# Patient Record
Sex: FEMALE | URBAN - METROPOLITAN AREA
[De-identification: names, ages, dates, MRNs, and addresses within clinical notes are randomized per-mention and may not be internally consistent; named-entity substitution may affect disease eponyms.]

---

## 2020-12-01 ENCOUNTER — OUTPATIENT (OUTPATIENT)
Dept: OUTPATIENT SERVICES | Facility: HOSPITAL | Age: 35
LOS: 1 days | End: 2020-12-01
Payer: MEDICAID

## 2020-12-15 ENCOUNTER — INPATIENT (INPATIENT)
Facility: HOSPITAL | Age: 35
LOS: 8 days | Discharge: HOME | End: 2020-12-24
Attending: PSYCHIATRY & NEUROLOGY | Admitting: PSYCHIATRY & NEUROLOGY
Payer: MEDICAID

## 2020-12-15 VITALS
DIASTOLIC BLOOD PRESSURE: 92 MMHG | OXYGEN SATURATION: 97 % | TEMPERATURE: 98 F | SYSTOLIC BLOOD PRESSURE: 156 MMHG | RESPIRATION RATE: 16 BRPM | HEART RATE: 99 BPM

## 2020-12-15 DIAGNOSIS — F20.0 PARANOID SCHIZOPHRENIA: ICD-10-CM

## 2020-12-15 DIAGNOSIS — F25.9 SCHIZOAFFECTIVE DISORDER, UNSPECIFIED: ICD-10-CM

## 2020-12-15 LAB
ALBUMIN SERPL ELPH-MCNC: 4.9 G/DL — SIGNIFICANT CHANGE UP (ref 3.3–5)
ALP SERPL-CCNC: 53 U/L — SIGNIFICANT CHANGE UP (ref 40–120)
ALT FLD-CCNC: 15 U/L — SIGNIFICANT CHANGE UP (ref 4–33)
ANION GAP SERPL CALC-SCNC: 21 MMOL/L — HIGH (ref 7–14)
AST SERPL-CCNC: 34 U/L — HIGH (ref 4–32)
B PERT DNA SPEC QL NAA+PROBE: SIGNIFICANT CHANGE UP
BASOPHILS # BLD AUTO: 0.02 K/UL — SIGNIFICANT CHANGE UP (ref 0–0.2)
BASOPHILS NFR BLD AUTO: 0.2 % — SIGNIFICANT CHANGE UP (ref 0–2)
BILIRUB SERPL-MCNC: 0.8 MG/DL — SIGNIFICANT CHANGE UP (ref 0.2–1.2)
BUN SERPL-MCNC: 12 MG/DL — SIGNIFICANT CHANGE UP (ref 7–23)
C PNEUM DNA SPEC QL NAA+PROBE: SIGNIFICANT CHANGE UP
CALCIUM SERPL-MCNC: 10.3 MG/DL — SIGNIFICANT CHANGE UP (ref 8.4–10.5)
CHLORIDE SERPL-SCNC: 98 MMOL/L — SIGNIFICANT CHANGE UP (ref 98–107)
CO2 SERPL-SCNC: 15 MMOL/L — LOW (ref 22–31)
CREAT SERPL-MCNC: 1.11 MG/DL — SIGNIFICANT CHANGE UP (ref 0.5–1.3)
EOSINOPHIL # BLD AUTO: 0.02 K/UL — SIGNIFICANT CHANGE UP (ref 0–0.5)
EOSINOPHIL NFR BLD AUTO: 0.2 % — SIGNIFICANT CHANGE UP (ref 0–6)
FLUAV H1 2009 PAND RNA SPEC QL NAA+PROBE: SIGNIFICANT CHANGE UP
FLUAV H1 RNA SPEC QL NAA+PROBE: SIGNIFICANT CHANGE UP
FLUAV H3 RNA SPEC QL NAA+PROBE: SIGNIFICANT CHANGE UP
FLUAV SUBTYP SPEC NAA+PROBE: SIGNIFICANT CHANGE UP
FLUBV RNA SPEC QL NAA+PROBE: SIGNIFICANT CHANGE UP
GLUCOSE BLDC GLUCOMTR-MCNC: 102 MG/DL — HIGH (ref 70–99)
GLUCOSE BLDC GLUCOMTR-MCNC: 63 MG/DL — LOW (ref 70–99)
GLUCOSE BLDC GLUCOMTR-MCNC: 67 MG/DL — LOW (ref 70–99)
GLUCOSE BLDC GLUCOMTR-MCNC: 70 MG/DL — SIGNIFICANT CHANGE UP (ref 70–99)
GLUCOSE BLDC GLUCOMTR-MCNC: 79 MG/DL — SIGNIFICANT CHANGE UP (ref 70–99)
GLUCOSE SERPL-MCNC: 73 MG/DL — SIGNIFICANT CHANGE UP (ref 70–99)
HADV DNA SPEC QL NAA+PROBE: SIGNIFICANT CHANGE UP
HCG SERPL-ACNC: <5 MIU/ML — SIGNIFICANT CHANGE UP
HCOV PNL SPEC NAA+PROBE: SIGNIFICANT CHANGE UP
HCT VFR BLD CALC: 46.5 % — HIGH (ref 34.5–45)
HGB BLD-MCNC: 15.1 G/DL — SIGNIFICANT CHANGE UP (ref 11.5–15.5)
HMPV RNA SPEC QL NAA+PROBE: SIGNIFICANT CHANGE UP
HPIV1 RNA SPEC QL NAA+PROBE: SIGNIFICANT CHANGE UP
HPIV2 RNA SPEC QL NAA+PROBE: SIGNIFICANT CHANGE UP
HPIV3 RNA SPEC QL NAA+PROBE: SIGNIFICANT CHANGE UP
HPIV4 RNA SPEC QL NAA+PROBE: SIGNIFICANT CHANGE UP
IANC: 6.68 K/UL — SIGNIFICANT CHANGE UP (ref 1.5–8.5)
IMM GRANULOCYTES NFR BLD AUTO: 0.3 % — SIGNIFICANT CHANGE UP (ref 0–1.5)
LYMPHOCYTES # BLD AUTO: 1.57 K/UL — SIGNIFICANT CHANGE UP (ref 1–3.3)
LYMPHOCYTES # BLD AUTO: 17.2 % — SIGNIFICANT CHANGE UP (ref 13–44)
MCHC RBC-ENTMCNC: 27.1 PG — SIGNIFICANT CHANGE UP (ref 27–34)
MCHC RBC-ENTMCNC: 32.5 GM/DL — SIGNIFICANT CHANGE UP (ref 32–36)
MCV RBC AUTO: 83.5 FL — SIGNIFICANT CHANGE UP (ref 80–100)
MONOCYTES # BLD AUTO: 0.82 K/UL — SIGNIFICANT CHANGE UP (ref 0–0.9)
MONOCYTES NFR BLD AUTO: 9 % — SIGNIFICANT CHANGE UP (ref 2–14)
NEUTROPHILS # BLD AUTO: 6.68 K/UL — SIGNIFICANT CHANGE UP (ref 1.8–7.4)
NEUTROPHILS NFR BLD AUTO: 73.1 % — SIGNIFICANT CHANGE UP (ref 43–77)
NRBC # BLD: 0 /100 WBCS — SIGNIFICANT CHANGE UP
NRBC # FLD: 0 K/UL — SIGNIFICANT CHANGE UP
PLATELET # BLD AUTO: 178 K/UL — SIGNIFICANT CHANGE UP (ref 150–400)
POTASSIUM SERPL-MCNC: 4.3 MMOL/L — SIGNIFICANT CHANGE UP (ref 3.5–5.3)
POTASSIUM SERPL-SCNC: 4.3 MMOL/L — SIGNIFICANT CHANGE UP (ref 3.5–5.3)
PROT SERPL-MCNC: 8.1 G/DL — SIGNIFICANT CHANGE UP (ref 6–8.3)
RAPID RVP RESULT: SIGNIFICANT CHANGE UP
RBC # BLD: 5.57 M/UL — HIGH (ref 3.8–5.2)
RBC # FLD: 13.9 % — SIGNIFICANT CHANGE UP (ref 10.3–14.5)
RSV RNA SPEC QL NAA+PROBE: SIGNIFICANT CHANGE UP
RV+EV RNA SPEC QL NAA+PROBE: SIGNIFICANT CHANGE UP
SARS-COV-2 IGG SERPL QL IA: NEGATIVE — SIGNIFICANT CHANGE UP
SARS-COV-2 IGM SERPL IA-ACNC: <0.1 INDEX — SIGNIFICANT CHANGE UP
SARS-COV-2 RNA SPEC QL NAA+PROBE: SIGNIFICANT CHANGE UP
SODIUM SERPL-SCNC: 134 MMOL/L — LOW (ref 135–145)
TOXICOLOGY SCREEN, DRUGS OF ABUSE, SERUM RESULT: SIGNIFICANT CHANGE UP
TSH SERPL-MCNC: 2.05 UIU/ML — SIGNIFICANT CHANGE UP (ref 0.27–4.2)
WBC # BLD: 9.14 K/UL — SIGNIFICANT CHANGE UP (ref 3.8–10.5)
WBC # FLD AUTO: 9.14 K/UL — SIGNIFICANT CHANGE UP (ref 3.8–10.5)

## 2020-12-15 PROCEDURE — 99285 EMERGENCY DEPT VISIT HI MDM: CPT

## 2020-12-15 PROCEDURE — 99284 EMERGENCY DEPT VISIT MOD MDM: CPT

## 2020-12-15 RX ORDER — DEXTROSE 50 % IN WATER 50 %
15 SYRINGE (ML) INTRAVENOUS ONCE
Refills: 0 | Status: DISCONTINUED | OUTPATIENT
Start: 2020-12-15 | End: 2020-12-22

## 2020-12-15 RX ORDER — DIPHENHYDRAMINE HCL 50 MG
50 CAPSULE ORAL ONCE
Refills: 0 | Status: DISCONTINUED | OUTPATIENT
Start: 2020-12-15 | End: 2020-12-24

## 2020-12-15 RX ORDER — GLUCAGON INJECTION, SOLUTION 0.5 MG/.1ML
1 INJECTION, SOLUTION SUBCUTANEOUS ONCE
Refills: 0 | Status: DISCONTINUED | OUTPATIENT
Start: 2020-12-15 | End: 2020-12-22

## 2020-12-15 RX ORDER — BENZTROPINE MESYLATE 1 MG
1 TABLET ORAL
Refills: 0 | Status: DISCONTINUED | OUTPATIENT
Start: 2020-12-15 | End: 2020-12-24

## 2020-12-15 RX ORDER — HALOPERIDOL DECANOATE 100 MG/ML
5 INJECTION INTRAMUSCULAR ONCE
Refills: 0 | Status: COMPLETED | OUTPATIENT
Start: 2020-12-15 | End: 2020-12-15

## 2020-12-15 RX ORDER — FLUPHENAZINE HYDROCHLORIDE 1 MG/1
5 TABLET, FILM COATED ORAL
Refills: 0 | Status: DISCONTINUED | OUTPATIENT
Start: 2020-12-15 | End: 2020-12-17

## 2020-12-15 RX ORDER — DIPHENHYDRAMINE HCL 50 MG
50 CAPSULE ORAL EVERY 6 HOURS
Refills: 0 | Status: DISCONTINUED | OUTPATIENT
Start: 2020-12-15 | End: 2020-12-24

## 2020-12-15 RX ORDER — INSULIN LISPRO 100/ML
VIAL (ML) SUBCUTANEOUS
Refills: 0 | Status: DISCONTINUED | OUTPATIENT
Start: 2020-12-15 | End: 2020-12-22

## 2020-12-15 RX ORDER — HALOPERIDOL DECANOATE 100 MG/ML
5 INJECTION INTRAMUSCULAR EVERY 6 HOURS
Refills: 0 | Status: DISCONTINUED | OUTPATIENT
Start: 2020-12-15 | End: 2020-12-24

## 2020-12-15 RX ORDER — HALOPERIDOL DECANOATE 100 MG/ML
5 INJECTION INTRAMUSCULAR ONCE
Refills: 0 | Status: DISCONTINUED | OUTPATIENT
Start: 2020-12-15 | End: 2020-12-24

## 2020-12-15 RX ADMIN — HALOPERIDOL DECANOATE 5 MILLIGRAM(S): 100 INJECTION INTRAMUSCULAR at 03:55

## 2020-12-15 RX ADMIN — Medication 2 MILLIGRAM(S): at 03:55

## 2020-12-15 NOTE — ED BEHAVIORAL HEALTH ASSESSMENT NOTE - VIOLENCE RISK FACTORS:
Impulsivity/Affective dysregulation/Feeling of being under threat and being unable to control threat/Violent ideation/threat/speech/Noncompliance with treatment/Irritability
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
## 2020-12-15 NOTE — ED BEHAVIORAL HEALTH ASSESSMENT NOTE - PSYCHIATRIC ISSUES AND PLAN (INCLUDE STANDING AND PRN MEDICATION)
Restart Prolixin 5mg po bid with hopes of transition to MANCILLA, c/w cogentin 1mg po bid, PRNS: Haldol 5mg po/im, ativan 2mg po/im q6hrs PRN

## 2020-12-15 NOTE — ED BEHAVIORAL HEALTH NOTE - BEHAVIORAL HEALTH NOTE
Worker received a call from patient's friend Norma ([phone]) who would like information related to patient. Worker informed Ms. Green that she is not able to provide information related to patient at this time. No collateral listed in provider note for patient. Worker was not previously involved with case. Worker informed that if Ms. Green needs information related to patient she would have to reach out to the patient. Discussed case with team.
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
## 2020-12-15 NOTE — ED BEHAVIORAL HEALTH ASSESSMENT NOTE - OTHER
Confucianist preoccupation pending bed assignment [id number] problems with housing, problems with access to healthcare

## 2020-12-15 NOTE — ED ADULT NURSE REASSESSMENT NOTE - NS ED NURSE REASSESS COMMENT FT1
Repeat blood glucose FS was 79mg/dl on repeat test at 1533. Left with  ED with [facility] EMS for transfer to [place] [address]. Alert, oriented, and ambulatory on leave. Reported to RN there.

## 2020-12-15 NOTE — ED ADULT NURSE REASSESSMENT NOTE - NS ED NURSE REASSESS COMMENT FT1
Pt arrives via stretcher with EMS and PS, she is awake with disorganized TP, hyperverbal and unable to verbally redirect. Pt medicated as ordered by Chris DELGADO on ems stretcher. After medication received, pt began yelling aggressively unprovoked. Pt remains with PD and handcuffed (not under arrest) until meds have good effect and pt is less agitated.

## 2020-12-15 NOTE — ED BEHAVIORAL HEALTH ASSESSMENT NOTE - NSSUICRSKFACTOR_PSY_ALL_CORE
Presenting Symptoms/Treatment Related Factors/Current and Past Psychiatric Diagnoses/Activating Events/Stressors

## 2020-12-15 NOTE — ED BEHAVIORAL HEALTH NOTE - BEHAVIORAL HEALTH NOTE
Worker was contacted by Patient's spouse Aries Deng [phone]. Spouse wanted to know if patient was being admitted to the hospital. Worker informed patient's spouse patient is admitted, and will be going to [place] [address].

## 2020-12-15 NOTE — ED BEHAVIORAL HEALTH ASSESSMENT NOTE - DESCRIPTION
brought in to ED with EMS and [place] in handcuffs. She was agitated, talking quickly, threatening and received Haldol 5mg and Ativan 2mg IM.   Vital Signs Last 24 Hrs  T(C): 36.7 (15 Dec 2020 03:30), Max: 36.7 (15 Dec 2020 03:30)  T(F): 98 (15 Dec 2020 03:30), Max: 98 (15 Dec 2020 03:30)  HR: 99 (15 Dec 2020 03:30) (99 - 99)  BP: 156/92 (15 Dec 2020 03:30) (156/92 - 156/92)  BP(mean): --  RR: 16 (15 Dec 2020 03:30) (16 - 16)  SpO2: 97% (15 Dec 2020 03:30) (97% - 97%) diabetes undomiciled, adopted, unemployed, has 2 children (6 and 2) who live with father

## 2020-12-15 NOTE — ED PROVIDER NOTE - PROGRESS NOTE DETAILS
Dr. James: Pt signed out to me at 8 AM from Dr. Perez, medically cleared and awaiting psychiatric admission; pt is a 34 yo female with schizophrenia who presented with disorganization.  She requires inpatient psychiatric hospitalization for stabilization.  I went to evaluate pt and she was sleeping, awoke easily, states that she wants to go home, denying any acute complaints.  Will continue to monitor while awaiting inpatient bed. NP Venecia- pt blood glucose was checked prior to [facility] transfer 68. Food and juice provided rechecked 63, more food and juice provided, encouraged PO intake, will reassess. No headache no dizziness, no increased thirst or oliguria.
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
## 2020-12-15 NOTE — ED BEHAVIORAL HEALTH NOTE - BEHAVIORAL HEALTH NOTE
COVID Exposure Screen- Patient     1.        *Have you had a COVID-19 test in the last 21 days?  (  ) Yes   ( x ) No   (  ) Unknown- Reason: ______  IF YES PROCEED TO QUESTION #2. IF NO OR UNKNOWN THEN PLEASE SKIP TO QUESTION #3.  2.        Date of test: ________  3.        3. Do you know the result? (  ) Negative   (  ) Positive   (  ) No result available  4.        *In the past 14 days, have you been around anyone with a positive COVID-19 test?*  (  ) Yes   (  x) No   (  ) Unknown- Reason (e.g. patient uncertain, sedated, refusing to answer, etc.):  ______  IF YES PROCEED TO QUESTION #5. IF NO or UNKNOWN, PLEASE SKIP TO QUESTION #10  5.        Were you within 6 feet of them for at least 15 minutes? (  ) Yes   (  ) No   (  ) Unknown- Reason: _____  6.        Have you provided care for them? (  ) Yes   (  ) No   (  ) Unknown- Reason: ______  7.        Have you had direct physical contact with them (touched, hugged, or kissed them)? (  ) Yes   (  ) No    (  ) Unknown- Reason: ___  8.        Have you shared eating or drinking utensils with them? (  ) Yes   (  ) No    (  ) Unknown- Reason: ____  9.        Have they sneezed, coughed, or somehow got respiratory droplets on you? (  ) Yes   (  ) No    (  ) Unknown- Reason: ______  10.     *Have you been out of [place] within the past 14 days?*  (  ) Yes   (x  ) No   (  ) Unknown- Reason (e.g. patient uncertain, sedated, refusing to answer, etc.): _______  IF YES PLEASE ANSWER THE FOLLOWING QUESTIONS:  11.     Which state/country have you been to? ______  12.     Were you there over 24 hours? (  ) Yes   (  ) No    (  ) Unknown- Reason: ______  13.     Date of return to [facility]: ______
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
## 2020-12-15 NOTE — ED BEHAVIORAL HEALTH ASSESSMENT NOTE - RISK ASSESSMENT
pt is at elevated risk of harm to others secondary to acute psychosis. Risk factors include med non-adherence, acute psychosis, paranoia, being undomiciled and recent unprovoked aggression.  Protective factors include No si/i/p, no hx of SA, no substance use.   Pt requires hospitalization for stabilization. Low Acute Suicide Risk

## 2020-12-15 NOTE — ED PROVIDER NOTE - CLINICAL SUMMARY MEDICAL DECISION MAKING FREE TEXT BOX
pt with symptoms that appear to be disorganized chronic schizophrenia.  Will consult  but anticipate need for emergent hospitalization.  Will clear medically in meantime.
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
## 2020-12-15 NOTE — ED ADULT NURSE REASSESSMENT NOTE - NS ED NURSE REASSESS COMMENT FT1
Received pt from previous shift. Pt asleep in bedroom, not in any physical distress. Even and unlabored breathing. Will continue monitoring.

## 2020-12-15 NOTE — ED ADULT NURSE REASSESSMENT NOTE - NS ED NURSE REASSESS COMMENT FT1
+ effect from IM meds received. Pt is laying in bed, she is arousable to verbal stimuli. Calm and cooperative with blood draw. Psych eval completed, lab result and MC pending for psychiatric admission. Will continue to monitor for safety.

## 2020-12-15 NOTE — ED BEHAVIORAL HEALTH ASSESSMENT NOTE - NSACTIVEVENT_PSY_ALL_CORE
Triggering events leading to humiliation, shame, and/or despair (e.g., Loss of relationship, financial or health status) (real or anticipated)/Pending incarceration or homelessness/Inadequate social supports
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
## 2020-12-15 NOTE — ED ADULT NURSE REASSESSMENT NOTE - NS ED NURSE REASSESS COMMENT FT1
Pt EMS transfer placed on hold, due to low blood glucose fingerstick at 1447- results were 67mg/dl. Given 2 cups orange juice PO and 1 can ginger ale. Pt EMS transfer placed on hold, due to low blood glucose fingerstick at 1447- results were 67mg/dl. Given 2 cups orange juice PO and 1 can ginger ale. will follow up.

## 2020-12-15 NOTE — ED BEHAVIORAL HEALTH ASSESSMENT NOTE - OTHER PAST PSYCHIATRIC HISTORY (INCLUDE DETAILS REGARDING ONSET, COURSE OF ILLNESS, INPATIENT/OUTPATIENT TREATMENT)
As per Leon patient has had 4 past hospitalizations in [place]. As per patient she was most recently hospitalized at [facility] in November.   As per leon patient is a current patient At [facility] in [place] - [phone]. Last billed was on 11/04/2020.

## 2020-12-15 NOTE — ED PROVIDER NOTE - PHYSICAL EXAMINATION
Gen: Well appearing in NAD  Head: NC/AT  Neck: trachea midline  Resp:  No distress  Ext: no deformities  Neuro:  A&O appears non focal  Skin:  Warm and dry as visualized  Psych:  Disorganized, tangential thoughts, no HI SI AH VH, delusions and paranoid Gen: Well appearing in NAD  Head: NC/AT  Neck: trachea midline  Resp:  No distress  Ext: no deformities  Neuro:  A&O appears non focal  Skin:  Warm and dry as visualized  Psych:  Disorganized, tangential thoughts, no HI SI AH VH, delusions and paranoid.
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
## 2020-12-16 LAB
AMPHET UR-MCNC: NEGATIVE — SIGNIFICANT CHANGE UP
BARBITURATES UR SCN-MCNC: NEGATIVE — SIGNIFICANT CHANGE UP
BENZODIAZ UR-MCNC: NEGATIVE — SIGNIFICANT CHANGE UP
CHOLEST SERPL-MCNC: 235 MG/DL — HIGH
COCAINE METAB.OTHER UR-MCNC: NEGATIVE — SIGNIFICANT CHANGE UP
CREATININE URINE RESULT, DAU: 56 MG/DL — SIGNIFICANT CHANGE UP
GLUCOSE BLDC GLUCOMTR-MCNC: 71 MG/DL — SIGNIFICANT CHANGE UP (ref 70–99)
GLUCOSE BLDC GLUCOMTR-MCNC: 74 MG/DL — SIGNIFICANT CHANGE UP (ref 70–99)
GLUCOSE BLDC GLUCOMTR-MCNC: 77 MG/DL — SIGNIFICANT CHANGE UP (ref 70–99)
GLUCOSE BLDC GLUCOMTR-MCNC: 96 MG/DL — SIGNIFICANT CHANGE UP (ref 70–99)
HCT VFR BLD CALC: 44.8 % — SIGNIFICANT CHANGE UP (ref 34.5–45)
HDLC SERPL-MCNC: 48 MG/DL — LOW
HGB BLD-MCNC: 14.7 G/DL — SIGNIFICANT CHANGE UP (ref 11.5–15.5)
LIPID PNL WITH DIRECT LDL SERPL: 176 MG/DL — HIGH
MCHC RBC-ENTMCNC: 27.3 PG — SIGNIFICANT CHANGE UP (ref 27–34)
MCHC RBC-ENTMCNC: 32.8 GM/DL — SIGNIFICANT CHANGE UP (ref 32–36)
MCV RBC AUTO: 83.3 FL — SIGNIFICANT CHANGE UP (ref 80–100)
METHADONE UR-MCNC: NEGATIVE — SIGNIFICANT CHANGE UP
NON HDL CHOLESTEROL: 187 MG/DL — HIGH
NRBC # BLD: 0 /100 WBCS — SIGNIFICANT CHANGE UP
NRBC # FLD: 0 K/UL — SIGNIFICANT CHANGE UP
OPIATES UR-MCNC: NEGATIVE — SIGNIFICANT CHANGE UP
OXYCODONE UR-MCNC: NEGATIVE — SIGNIFICANT CHANGE UP
PCP SPEC-MCNC: SIGNIFICANT CHANGE UP
PCP UR-MCNC: NEGATIVE — SIGNIFICANT CHANGE UP
PLATELET # BLD AUTO: 331 K/UL — SIGNIFICANT CHANGE UP (ref 150–400)
RBC # BLD: 5.38 M/UL — HIGH (ref 3.8–5.2)
RBC # FLD: 14 % — SIGNIFICANT CHANGE UP (ref 10.3–14.5)
THC UR QL: POSITIVE
TRIGL SERPL-MCNC: 56 MG/DL — SIGNIFICANT CHANGE UP
WBC # BLD: 5.53 K/UL — SIGNIFICANT CHANGE UP (ref 3.8–10.5)
WBC # FLD AUTO: 5.53 K/UL — SIGNIFICANT CHANGE UP (ref 3.8–10.5)

## 2020-12-16 PROCEDURE — 99222 1ST HOSP IP/OBS MODERATE 55: CPT

## 2020-12-16 RX ADMIN — FLUPHENAZINE HYDROCHLORIDE 5 MILLIGRAM(S): 1 TABLET, FILM COATED ORAL at 22:06

## 2020-12-16 RX ADMIN — Medication 1 MILLIGRAM(S): at 22:06

## 2020-12-16 NOTE — BH INPATIENT PSYCHIATRY ASSESSMENT NOTE - NSSUICRSKFACTOR_PSY_ALL_CORE
Current and Past Psychiatric Diagnoses/Presenting Symptoms/Treatment Related Factors/Activating Events/Stressors

## 2020-12-16 NOTE — BH SOCIAL WORK INITIAL PSYCHOSOCIAL EVALUATION - NSBHABUSEAPSFT_PSY_ALL_CORE
Patient reports she has an open case with a hx of prior cases. She will not give more details. She is not willing to sign consent for ACS.

## 2020-12-16 NOTE — BH INPATIENT PSYCHIATRY ASSESSMENT NOTE - NSBHMETABOLIC_PSY_ALL_CORE_FT
BMI: BMI (kg/m2): 27.8 (12-15-20 @ 15:55)  HbA1c:   Glucose: POCT Blood Glucose.: 71 mg/dL (12-16-20 @ 11:52)    BP: 133/59 (12-15-20 @ 14:15) (105/65 - 156/92)  Lipid Panel: Date/Time: 12-16-20 @ 12:20  Cholesterol, Serum: 235  Direct LDL: --  HDL Cholesterol, Serum: 48  Total Cholesterol/HDL Ration Measurement: --  Triglycerides, Serum: 56

## 2020-12-16 NOTE — BH INPATIENT PSYCHIATRY ASSESSMENT NOTE - NSBHADMITMEDEDUDETAILS_PSY_A_CORE FT
Pt. was admitted on current medication regimen and improved. Consequences of treatment non-adherence discussed and pt. continues to refuse medications.

## 2020-12-16 NOTE — PSYCHIATRIC REHAB INITIAL EVALUATION - NSBHEDULEVEL_PSY_ALL_CORE
Pt reported she has some college credits and she has 12 credit left to be graduate./High (Secondary) School

## 2020-12-16 NOTE — PSYCHIATRIC REHAB INITIAL EVALUATION - NSBHSTRENGTHHOME_PSY_ALL_CORE
Pt stated she enjoy her home life with her children. Pt stated she likes to cook and spending time with her children.

## 2020-12-16 NOTE — BH INPATIENT PSYCHIATRY ASSESSMENT NOTE - NSACTIVEVENT_PSY_ALL_CORE
Triggering events leading to humiliation, shame, and/or despair (e.g., Loss of relationship, financial or health status) (real or anticipated)/Substance intoxication or withdrawal/Inadequate social supports

## 2020-12-16 NOTE — BH INPATIENT PSYCHIATRY ASSESSMENT NOTE - NSBHCHARTREVIEWVS_PSY_A_CORE FT
Vital Signs Last 24 Hrs  T(C): 36.4 (16 Dec 2020 09:15), Max: 37.4 (15 Dec 2020 15:55)  T(F): 97.6 (16 Dec 2020 09:15), Max: 99.3 (15 Dec 2020 15:55)  HR: 93 (15 Dec 2020 14:15) (93 - 93)  BP: 133/59 (15 Dec 2020 14:15) (133/59 - 133/59)  BP(mean): --  RR: 17 (16 Dec 2020 09:15) (17 - 18)  SpO2: 100% (16 Dec 2020 09:15) (100% - 100%)
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
## 2020-12-16 NOTE — PSYCHIATRIC REHAB INITIAL EVALUATION - NSBHPRTOOLBOX_PSY_ALL_CORE
Exercise/Family support/Friend support Exercise/Family support/Friend support/Spirituality/Latter day

## 2020-12-16 NOTE — PSYCHIATRIC REHAB INITIAL EVALUATION - NSBHALCSUBTREAT_PSY_ALL_CORE
Outpatient clinic (specify) Pt stated she is in treatment with [facility] prior to her admission./Outpatient clinic (specify)

## 2020-12-16 NOTE — BH INPATIENT PSYCHIATRY ASSESSMENT NOTE - VIOLENCE RISK FACTORS:
Substance abuse/Noncompliance with treatment/Community stressors that increase the risk of destabilization

## 2020-12-16 NOTE — BH INPATIENT PSYCHIATRY ASSESSMENT NOTE - CURRENT MEDICATION
MEDICATIONS  (STANDING):  benztropine 1 milliGRAM(s) Oral two times a day  dextrose 40% Gel 15 Gram(s) Oral once  fluPHENAZine 5 milliGRAM(s) Oral two times a day  glucagon  Injectable 1 milliGRAM(s) IntraMuscular once  insulin lispro (ADMELOG) corrective regimen sliding scale   SubCutaneous three times a day before meals    MEDICATIONS  (PRN):  diphenhydrAMINE 50 milliGRAM(s) Oral every 6 hours PRN eps ppx/agitation  diphenhydrAMINE   Injectable 50 milliGRAM(s) IntraMuscular once PRN eps ppx/severe agitation  haloperidol     Tablet 5 milliGRAM(s) Oral every 6 hours PRN agitation  haloperidol    Injectable 5 milliGRAM(s) IntraMuscular once PRN severe agitation  LORazepam     Tablet 2 milliGRAM(s) Oral every 6 hours PRN Agitation  LORazepam   Injectable 2 milliGRAM(s) IntraMuscular Once PRN severe agitation

## 2020-12-16 NOTE — BH INPATIENT PSYCHIATRY ASSESSMENT NOTE - OTHER PAST PSYCHIATRIC HISTORY (INCLUDE DETAILS REGARDING ONSET, COURSE OF ILLNESS, INPATIENT/OUTPATIENT TREATMENT)
As per Leon patient has had 4 past hospitalizations in [place]. As per patient she was most recently hospitalized at [facility] in November.   As per leon patient is a current patient At [facility] in [place] - [phone]. Last billed was on 11/04/2020.

## 2020-12-16 NOTE — BH INPATIENT PSYCHIATRY ASSESSMENT NOTE - NSCGISEVERILLNESS_PSY_ALL_CORE
4 = Moderately ill – overt symptoms causing noticeable, but modest, functional impairment or distress; symptom level may warrant medication

## 2020-12-16 NOTE — BH INPATIENT PSYCHIATRY ASSESSMENT NOTE - DESCRIPTION
undomiciled, adopted, unemployed, has 2 children (6 and 2) who live with father undomiciled, adopted, unemployed, has 2 children (6 and 2) who live with father. He has temporary custody. Active in her Yarsanism.

## 2020-12-16 NOTE — BH INPATIENT PSYCHIATRY ASSESSMENT NOTE - NSTREATMENTCERTY_PSY_ALL_CORE

## 2020-12-16 NOTE — PSYCHIATRIC REHAB INITIAL EVALUATION - NSBHEMPSTRENGTHSFT_PSY_ALL_CORE
Pt stated she likes her job and  Pt stated she likes her job. Pt is unable to identify other strengths of employment at this time.
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
## 2020-12-16 NOTE — BH INPATIENT PSYCHIATRY ASSESSMENT NOTE - NSSUICPROTFACT_PSY_ALL_CORE
Responsibility to children, family, or others/Cultural, spiritual and/or moral attitudes against suicide/Synagogue beliefs
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
## 2020-12-16 NOTE — PSYCHIATRIC REHAB INITIAL EVALUATION - NSBHEMPDATESFT_PSY_ALL_CORE
Pt stated she has been working with her current employer for the past 3 months Pt stated she has been working with her current employer for the past 3 months.
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
## 2020-12-17 LAB
GLUCOSE BLDC GLUCOMTR-MCNC: 70 MG/DL — SIGNIFICANT CHANGE UP (ref 70–99)
GLUCOSE BLDC GLUCOMTR-MCNC: 94 MG/DL — SIGNIFICANT CHANGE UP (ref 70–99)
GLUCOSE BLDC GLUCOMTR-MCNC: 95 MG/DL — SIGNIFICANT CHANGE UP (ref 70–99)

## 2020-12-17 PROCEDURE — 99232 SBSQ HOSP IP/OBS MODERATE 35: CPT

## 2020-12-17 RX ORDER — FLUPHENAZINE HYDROCHLORIDE 1 MG/1
5 TABLET, FILM COATED ORAL AT BEDTIME
Refills: 0 | Status: DISCONTINUED | OUTPATIENT
Start: 2020-12-17 | End: 2020-12-24

## 2020-12-17 RX ORDER — FLUPHENAZINE HYDROCHLORIDE 1 MG/1
12.5 TABLET, FILM COATED ORAL ONCE
Refills: 0 | Status: COMPLETED | OUTPATIENT
Start: 2020-12-18 | End: 2020-12-18

## 2020-12-17 RX ADMIN — Medication 1 MILLIGRAM(S): at 20:14

## 2020-12-17 RX ADMIN — FLUPHENAZINE HYDROCHLORIDE 5 MILLIGRAM(S): 1 TABLET, FILM COATED ORAL at 09:23

## 2020-12-17 RX ADMIN — FLUPHENAZINE HYDROCHLORIDE 5 MILLIGRAM(S): 1 TABLET, FILM COATED ORAL at 20:13

## 2020-12-17 RX ADMIN — Medication 1 MILLIGRAM(S): at 09:23

## 2020-12-17 NOTE — BH INPATIENT PSYCHIATRY PROGRESS NOTE - NSBHASSESSSUMMFT_PSY_ALL_CORE
The patient is guarded, minimizing all symptoms, refusing medications, denying she has schizophrenia or diabetes; A1C scheduled for tomorrow. Given collateral information and recent history, medication is indicated as she can become aggressive and is a risk of harm to herself and others when decompensated. She is currently homeless, with no income further increasing her risks factors for decompensation. She denies access to firearms. [facility]OO will be pursued if pt. continues to refuse treatment.    Plan:  Prolixin 10mg BID  Cogentin 1mg BID  Insulin sliding scale  Family meeting with   MOO   Safety planning, group therapy The patient is more engaged and forthcoming today; still appears internally pre-occupied. She is agreeable to MANCILLA. Medication compliant. No SE noted.    Plan:  Prolixin MANCILLA 12/17 12.5mg   Supplement with Prolixin 5mg QHS for 2 weeks  Insulin sliding scale pending HA1C  Encourage groups    dispo: shelter with routine follow-up vs. [facility]

## 2020-12-17 NOTE — BH INPATIENT PSYCHIATRY PROGRESS NOTE - NSBHFUPINTERVALHXFT_PSY_A_CORE
Follow-up for schizophrenia. Chart reviewed. No events reported overnight. Pt. reports sleeping and eating normally. The patient was found in bed resting; she reports her morning medications make her tired. The patient reported she stopped taking her medications because she was fasting with her Zoroastrianism. She stated that when she takes her medications while fasting, she becomes sick. She agreed to fast while eating fruits and vegetables to avoid adverse effects. The patient also agreed to prolixin dec to avoid non-compliance. She denies AVH and delusions. Medication compliant today.

## 2020-12-18 LAB
GLUCOSE BLDC GLUCOMTR-MCNC: 102 MG/DL — HIGH (ref 70–99)
GLUCOSE BLDC GLUCOMTR-MCNC: 105 MG/DL — HIGH (ref 70–99)
GLUCOSE BLDC GLUCOMTR-MCNC: 118 MG/DL — HIGH (ref 70–99)

## 2020-12-18 PROCEDURE — 99232 SBSQ HOSP IP/OBS MODERATE 35: CPT

## 2020-12-18 RX ADMIN — Medication 1 MILLIGRAM(S): at 09:24

## 2020-12-18 RX ADMIN — FLUPHENAZINE HYDROCHLORIDE 12.5 MILLIGRAM(S): 1 TABLET, FILM COATED ORAL at 12:04

## 2020-12-18 RX ADMIN — Medication 1 MILLIGRAM(S): at 20:54

## 2020-12-18 RX ADMIN — FLUPHENAZINE HYDROCHLORIDE 5 MILLIGRAM(S): 1 TABLET, FILM COATED ORAL at 20:54

## 2020-12-18 NOTE — BH INPATIENT PSYCHIATRY PROGRESS NOTE - NSBHFUPINTERVALHXFT_PSY_A_CORE
Follow-up for schizophrenia. Chart reviewed. No events reported overnight and no PRN medication required for agitation. The patient presented pleasant and cooperative during the interview today. She endorsed improved insight into her illness and stated she is eager to be discharge to continue outpatient treatment. She is aware of MANCILLA Prolixin administration today. Risk/ benefits discussed with the patient and she verbalized understanding. She denies AVH and no delusions elicited. The patient is compliant with her medication regimen and denies side effects.

## 2020-12-18 NOTE — BH INPATIENT PSYCHIATRY PROGRESS NOTE - NSBHASSESSSUMMFT_PSY_ALL_CORE
The patient is more engaged and forthcoming today; still appears internally pre-occupied. She is agreeable to MANCILLA. Medication compliant. No SE noted.    Plan:  Prolixin 12.5mg MANCILLA administered today.  Supplement with Prolixin 5mg QHS for 2 weeks  Insulin sliding scale pending HA1C  Encourage groups    dispo: shelter with routine follow-up vs. [facility]

## 2020-12-19 LAB
GLUCOSE BLDC GLUCOMTR-MCNC: 105 MG/DL — HIGH (ref 70–99)
GLUCOSE BLDC GLUCOMTR-MCNC: 109 MG/DL — HIGH (ref 70–99)
GLUCOSE BLDC GLUCOMTR-MCNC: 83 MG/DL — SIGNIFICANT CHANGE UP (ref 70–99)
GLUCOSE BLDC GLUCOMTR-MCNC: 87 MG/DL — SIGNIFICANT CHANGE UP (ref 70–99)

## 2020-12-19 PROCEDURE — 99232 SBSQ HOSP IP/OBS MODERATE 35: CPT

## 2020-12-19 RX ADMIN — FLUPHENAZINE HYDROCHLORIDE 5 MILLIGRAM(S): 1 TABLET, FILM COATED ORAL at 20:24

## 2020-12-19 RX ADMIN — Medication 1 MILLIGRAM(S): at 09:12

## 2020-12-19 RX ADMIN — Medication 1 MILLIGRAM(S): at 20:24

## 2020-12-19 NOTE — BH INPATIENT PSYCHIATRY PROGRESS NOTE - NSBHFUPINTERVALHXFT_PSY_A_CORE
Patient is followed up for Schizophrenia, admitted for psychosis and bizarre behaviors.  Chart, medications and labs reviewed.  Patient is discussed with nursing staff. No significant overnight issues.  Patient is calm, cooperative, bright affect, smiling  during interview.  Patient describes mood as “I’m happy today it’s my daughters 6th birthday.”  Denies SI/HI, denies all psychotic features. Minimal psychotic symptoms elicited. TP: linear, organized.   No mention of sleep disturbances or appetite concerns. Patient is more compliant with medications, no reported or observed adverse effects.  No akithisia, EPS, or tremors. Received her Prolixin Dec 12.5mg yesterday.  Patient reports that she is glad she received her MANCILLA. Self- care remains adequate.  Has had no behavioral issues on unit, requiring emergent IM medications, patient is relating well with staff and peers.  Pt offers no complaints, but inquired about discharge. Reports she goes to [facility] for outpatient. Patient is deferred to treatment team.

## 2020-12-19 NOTE — BH INPATIENT PSYCHIATRY PROGRESS NOTE - NSBHANTIPSYCHOTIC_PSY_ALL_CORE_FT
HA1C and lipid panel taken today
HA1C and lipid panel ordered but was not rendered today. Re-ordered for 12/21 and will follow-up. 
Patient received Prolixin Dec 12.5mg on 12/18/20

## 2020-12-20 LAB
GLUCOSE BLDC GLUCOMTR-MCNC: 91 MG/DL — SIGNIFICANT CHANGE UP (ref 70–99)
GLUCOSE BLDC GLUCOMTR-MCNC: 95 MG/DL — SIGNIFICANT CHANGE UP (ref 70–99)
GLUCOSE BLDC GLUCOMTR-MCNC: 99 MG/DL — SIGNIFICANT CHANGE UP (ref 70–99)

## 2020-12-20 PROCEDURE — 99232 SBSQ HOSP IP/OBS MODERATE 35: CPT

## 2020-12-20 RX ADMIN — Medication 1 MILLIGRAM(S): at 08:38

## 2020-12-20 RX ADMIN — FLUPHENAZINE HYDROCHLORIDE 5 MILLIGRAM(S): 1 TABLET, FILM COATED ORAL at 20:40

## 2020-12-20 RX ADMIN — Medication 1 MILLIGRAM(S): at 20:40

## 2020-12-20 NOTE — BH INPATIENT PSYCHIATRY PROGRESS NOTE - NSBHFUPINTERVALHXFT_PSY_A_CORE
Patient is followed up for Schizophrenia, admitted for psychosis and bizarre behaviors.  Chart, medications and labs reviewed.  Patient is discussed with nursing staff. No significant overnight issues.  Patient is calm, cooperative, engaging, bright affect, Patient describes mood as “I’m doing alright.”  Denies SI/HI, denies all psychotic features. Minimal psychotic symptoms elicited. TP: linear, organized.   No mention of sleep disturbances or appetite concerns. Good compliance with medications, no new SE.   Received her Prolixin Dec 12.5mg.  Self- care remains adequate.  Has had no behavioral issues on unit. Pt offers no complaints. Reports she feels ready to be discharged. Patient is encouraged to discuss matter with her primary NP.

## 2020-12-21 LAB
A1C WITH ESTIMATED AVERAGE GLUCOSE RESULT: 5.3 % — SIGNIFICANT CHANGE UP (ref 4–5.6)
CHOLEST SERPL-MCNC: 193 MG/DL — SIGNIFICANT CHANGE UP
ESTIMATED AVERAGE GLUCOSE: 105 MG/DL — SIGNIFICANT CHANGE UP (ref 68–114)
GLUCOSE BLDC GLUCOMTR-MCNC: 104 MG/DL — HIGH (ref 70–99)
GLUCOSE BLDC GLUCOMTR-MCNC: 93 MG/DL — SIGNIFICANT CHANGE UP (ref 70–99)
GLUCOSE BLDC GLUCOMTR-MCNC: 97 MG/DL — SIGNIFICANT CHANGE UP (ref 70–99)
HDLC SERPL-MCNC: 46 MG/DL — LOW
LIPID PNL WITH DIRECT LDL SERPL: 128 MG/DL — HIGH
NON HDL CHOLESTEROL: 147 MG/DL — HIGH
TRIGL SERPL-MCNC: 94 MG/DL — SIGNIFICANT CHANGE UP

## 2020-12-21 PROCEDURE — 99232 SBSQ HOSP IP/OBS MODERATE 35: CPT

## 2020-12-21 RX ADMIN — Medication 1 MILLIGRAM(S): at 20:32

## 2020-12-21 RX ADMIN — FLUPHENAZINE HYDROCHLORIDE 5 MILLIGRAM(S): 1 TABLET, FILM COATED ORAL at 20:31

## 2020-12-21 RX ADMIN — Medication 1 MILLIGRAM(S): at 10:16

## 2020-12-21 NOTE — BH INPATIENT PSYCHIATRY PROGRESS NOTE - NSBHFUPINTERVALHXFT_PSY_A_CORE
Patient is followed up for Schizophrenia, admitted for psychosis and bizarre behaviors.  Chart, medications and labs reviewed.  Patient is discussed with nursing staff. No significant overnight issues and no PRN medication required for agitation. Noted the patient is visible on the unit and in good behavorial control. She attended group therapy today. During the interview, the patient presented pleasant, cooperative and receptive. Noted improved insight into her illness and treatment. The patient stated "I hope I get discharge before [place]". Denies SI/HI, AH, VH and TH, no paranoia or delusions elicited.  Her sleep and appetite are good. She is compliant with her medication regimen and denies side effects.

## 2020-12-21 NOTE — BH INPATIENT PSYCHIATRY PROGRESS NOTE - NSBHASSESSSUMMFT_PSY_ALL_CORE
Noted the patient is visible on the unit and in good behavorial control. She attended group therapy today. During the interview, the patient presented pleasant, cooperative and receptive. Noted improved insight into her illness and treatment.     Plan:  Prolixin 12.5mg MANCILLA administered 12/17.  Supplement with Prolixin 5mg QHS for 2 weeks  HA1C is 5.3  Encourage groups

## 2020-12-22 LAB — GLUCOSE BLDC GLUCOMTR-MCNC: 98 MG/DL — SIGNIFICANT CHANGE UP (ref 70–99)

## 2020-12-22 PROCEDURE — 99232 SBSQ HOSP IP/OBS MODERATE 35: CPT

## 2020-12-22 RX ADMIN — Medication 1 MILLIGRAM(S): at 08:06

## 2020-12-22 RX ADMIN — Medication 1 MILLIGRAM(S): at 20:19

## 2020-12-22 RX ADMIN — FLUPHENAZINE HYDROCHLORIDE 5 MILLIGRAM(S): 1 TABLET, FILM COATED ORAL at 20:19

## 2020-12-22 NOTE — BH INPATIENT PSYCHIATRY PROGRESS NOTE - NSBHFUPINTERVALHXFT_PSY_A_CORE
Patient is followed up for Schizophrenia, admitted for psychosis and bizarre behaviors.  Chart, medications and labs reviewed with team. No significant overnight  Patient is followed up for Schizophrenia, admitted for psychosis and bizarre behaviors.  Chart, medications and labs reviewed with team. No significant events overnight or over the weekend. The patient reports she is eating and sleeping normally. She stated that she no longer feels drowsy during the day after receiving Prolixin Dec. The patient reports she is committed to abstinence from illicit substances and remaining compliant with her medications and treatment plan. Pt. denied AVH and delusions, SI/HI intent or plan. Medication compliant. No SE noted.

## 2020-12-22 NOTE — BH INPATIENT PSYCHIATRY PROGRESS NOTE - NSBHASSESSSUMMFT_PSY_ALL_CORE
Noted the patient is visible on the unit and in good behavorial control. She attended group therapy today. During the interview, the patient presented pleasant, cooperative and receptive. Noted improved insight into her illness and treatment.     Plan:  Prolixin 12.5mg MANCILLA administered 12/17.  Supplement with Prolixin 5mg QHS for 2 weeks  HA1C is 5.3  Encourage groups The patient has demonstrated improvement. Taniya was considered given the collateral provided by her mother, but the patient denies all Sx of taniya. She is medication compliant. No SE noted. Discharge Thursday.     Plan:  Prolixin 12.5mg MANCILLA administered 12/17; next 12/31.  Supplement with Prolixin 5mg QHS for 2 weeks  HA1C is 5.3; discontinue fingersticks and sliding scale.  Encourage groups    Dispo: [facility].

## 2020-12-23 PROCEDURE — 99232 SBSQ HOSP IP/OBS MODERATE 35: CPT

## 2020-12-23 RX ORDER — FLUPHENAZINE HYDROCHLORIDE 1 MG/1
1 TABLET, FILM COATED ORAL
Qty: 7 | Refills: 0
Start: 2020-12-23 | End: 2020-12-29

## 2020-12-23 RX ORDER — BENZTROPINE MESYLATE 1 MG
1 TABLET ORAL
Qty: 28 | Refills: 0
Start: 2020-12-23 | End: 2021-01-05

## 2020-12-23 RX ADMIN — FLUPHENAZINE HYDROCHLORIDE 5 MILLIGRAM(S): 1 TABLET, FILM COATED ORAL at 20:51

## 2020-12-23 RX ADMIN — Medication 1 MILLIGRAM(S): at 20:52

## 2020-12-23 RX ADMIN — Medication 1 MILLIGRAM(S): at 08:58

## 2020-12-23 NOTE — BH INPATIENT PSYCHIATRY PROGRESS NOTE - NSBHFUPINTERVALHXFT_PSY_A_CORE
Patient is followed up for Schizophrenia, admitted for psychosis and bizarre behaviors.  Chart, medications and labs reviewed with team. No significant events overnight or over the weekend. The patient reports she is eating and sleeping normally. The writer and the patient had a conference call with her mother to discuss discharge planning and expectations. The patient reports she is dedicated to remaining treatment adherent.  Pt. denied AVH and delusions, SI/HI intent or plan. Medication compliant. No SE noted.

## 2020-12-23 NOTE — BH INPATIENT PSYCHIATRY PROGRESS NOTE - NSBHASSESSSUMMFT_PSY_ALL_CORE
The patient continues to demonstrate improvement. She was able to speak with her mother and address concerns on both sides.  She is medication compliant. No SE noted. Discharge Thursday.     Plan:  Prolixin 12.5mg MANCILLA administered 12/17; next 12/31.  Supplement with Prolixin 5mg QHS for 2 weeks    Encourage groups    Dispo: [facility].

## 2020-12-23 NOTE — BH INPATIENT PSYCHIATRY PROGRESS NOTE - NSTXCOPEGOALOTHER_PSY_ALL_CORE
maintain good behavioral control

## 2020-12-24 VITALS — SYSTOLIC BLOOD PRESSURE: 122 MMHG | DIASTOLIC BLOOD PRESSURE: 60 MMHG | HEART RATE: 61 BPM | TEMPERATURE: 97 F

## 2020-12-24 PROCEDURE — 99238 HOSP IP/OBS DSCHRG MGMT 30/<: CPT

## 2020-12-24 RX ADMIN — Medication 1 MILLIGRAM(S): at 09:07

## 2020-12-24 NOTE — BH INPATIENT PSYCHIATRY PROGRESS NOTE - PRN MEDS
MEDICATIONS  (PRN):  diphenhydrAMINE 50 milliGRAM(s) Oral every 6 hours PRN eps ppx/agitation  diphenhydrAMINE   Injectable 50 milliGRAM(s) IntraMuscular once PRN eps ppx/severe agitation  haloperidol     Tablet 5 milliGRAM(s) Oral every 6 hours PRN agitation  haloperidol    Injectable 5 milliGRAM(s) IntraMuscular once PRN severe agitation  LORazepam     Tablet 2 milliGRAM(s) Oral every 6 hours PRN Agitation  LORazepam   Injectable 2 milliGRAM(s) IntraMuscular Once PRN severe agitation  

## 2020-12-24 NOTE — BH INPATIENT PSYCHIATRY PROGRESS NOTE - NSBHMSEKNOWHOW_PSY_ALL_CORE
Educational attainment
Vocabulary
Vocabulary
Educational attainment
Vocabulary
Current Events/Vocabulary
Vocabulary
Vocabulary

## 2020-12-24 NOTE — BH INPATIENT PSYCHIATRY PROGRESS NOTE - MSE OPTIONS
Structured MSE

## 2020-12-24 NOTE — BH INPATIENT PSYCHIATRY PROGRESS NOTE - CURRENT MEDICATION
MEDICATIONS  (STANDING):  benztropine 1 milliGRAM(s) Oral two times a day  dextrose 40% Gel 15 Gram(s) Oral once  fluPHENAZine 5 milliGRAM(s) Oral at bedtime  glucagon  Injectable 1 milliGRAM(s) IntraMuscular once  insulin lispro (ADMELOG) corrective regimen sliding scale   SubCutaneous three times a day before meals    MEDICATIONS  (PRN):  diphenhydrAMINE 50 milliGRAM(s) Oral every 6 hours PRN eps ppx/agitation  diphenhydrAMINE   Injectable 50 milliGRAM(s) IntraMuscular once PRN eps ppx/severe agitation  haloperidol     Tablet 5 milliGRAM(s) Oral every 6 hours PRN agitation  haloperidol    Injectable 5 milliGRAM(s) IntraMuscular once PRN severe agitation  LORazepam     Tablet 2 milliGRAM(s) Oral every 6 hours PRN Agitation  LORazepam   Injectable 2 milliGRAM(s) IntraMuscular Once PRN severe agitation  
MEDICATIONS  (STANDING):  benztropine 1 milliGRAM(s) Oral two times a day  dextrose 40% Gel 15 Gram(s) Oral once  fluPHENAZine 5 milliGRAM(s) Oral at bedtime  glucagon  Injectable 1 milliGRAM(s) IntraMuscular once  insulin lispro (ADMELOG) corrective regimen sliding scale   SubCutaneous three times a day before meals    MEDICATIONS  (PRN):  diphenhydrAMINE 50 milliGRAM(s) Oral every 6 hours PRN eps ppx/agitation  diphenhydrAMINE   Injectable 50 milliGRAM(s) IntraMuscular once PRN eps ppx/severe agitation  haloperidol     Tablet 5 milliGRAM(s) Oral every 6 hours PRN agitation  haloperidol    Injectable 5 milliGRAM(s) IntraMuscular once PRN severe agitation  LORazepam     Tablet 2 milliGRAM(s) Oral every 6 hours PRN agitation  LORazepam   Injectable 2 milliGRAM(s) IntraMuscular Once PRN severe agitation  
MEDICATIONS  (STANDING):  benztropine 1 milliGRAM(s) Oral two times a day  dextrose 40% Gel 15 Gram(s) Oral once  fluPHENAZine 5 milliGRAM(s) Oral two times a day  glucagon  Injectable 1 milliGRAM(s) IntraMuscular once  insulin lispro (ADMELOG) corrective regimen sliding scale   SubCutaneous three times a day before meals    MEDICATIONS  (PRN):  diphenhydrAMINE 50 milliGRAM(s) Oral every 6 hours PRN eps ppx/agitation  diphenhydrAMINE   Injectable 50 milliGRAM(s) IntraMuscular once PRN eps ppx/severe agitation  haloperidol     Tablet 5 milliGRAM(s) Oral every 6 hours PRN agitation  haloperidol    Injectable 5 milliGRAM(s) IntraMuscular once PRN severe agitation  LORazepam     Tablet 2 milliGRAM(s) Oral every 6 hours PRN Agitation  LORazepam   Injectable 2 milliGRAM(s) IntraMuscular Once PRN severe agitation  
MEDICATIONS  (STANDING):  benztropine 1 milliGRAM(s) Oral two times a day  fluPHENAZine 5 milliGRAM(s) Oral at bedtime    MEDICATIONS  (PRN):  diphenhydrAMINE 50 milliGRAM(s) Oral every 6 hours PRN eps ppx/agitation  diphenhydrAMINE   Injectable 50 milliGRAM(s) IntraMuscular once PRN eps ppx/severe agitation  haloperidol     Tablet 5 milliGRAM(s) Oral every 6 hours PRN agitation  haloperidol    Injectable 5 milliGRAM(s) IntraMuscular once PRN severe agitation  LORazepam     Tablet 2 milliGRAM(s) Oral every 6 hours PRN agitation  LORazepam   Injectable 2 milliGRAM(s) IntraMuscular Once PRN severe agitation  
MEDICATIONS  (STANDING):  benztropine 1 milliGRAM(s) Oral two times a day  fluPHENAZine 5 milliGRAM(s) Oral at bedtime    MEDICATIONS  (PRN):  diphenhydrAMINE 50 milliGRAM(s) Oral every 6 hours PRN eps ppx/agitation  diphenhydrAMINE   Injectable 50 milliGRAM(s) IntraMuscular once PRN eps ppx/severe agitation  haloperidol     Tablet 5 milliGRAM(s) Oral every 6 hours PRN agitation  haloperidol    Injectable 5 milliGRAM(s) IntraMuscular once PRN severe agitation  LORazepam     Tablet 2 milliGRAM(s) Oral every 6 hours PRN agitation  LORazepam   Injectable 2 milliGRAM(s) IntraMuscular Once PRN severe agitation  
MEDICATIONS  (STANDING):  benztropine 1 milliGRAM(s) Oral two times a day  dextrose 40% Gel 15 Gram(s) Oral once  fluPHENAZine 5 milliGRAM(s) Oral at bedtime  glucagon  Injectable 1 milliGRAM(s) IntraMuscular once  insulin lispro (ADMELOG) corrective regimen sliding scale   SubCutaneous three times a day before meals    MEDICATIONS  (PRN):  diphenhydrAMINE 50 milliGRAM(s) Oral every 6 hours PRN eps ppx/agitation  diphenhydrAMINE   Injectable 50 milliGRAM(s) IntraMuscular once PRN eps ppx/severe agitation  haloperidol     Tablet 5 milliGRAM(s) Oral every 6 hours PRN agitation  haloperidol    Injectable 5 milliGRAM(s) IntraMuscular once PRN severe agitation  LORazepam     Tablet 2 milliGRAM(s) Oral every 6 hours PRN Agitation  LORazepam   Injectable 2 milliGRAM(s) IntraMuscular Once PRN severe agitation  

## 2020-12-24 NOTE — BH INPATIENT PSYCHIATRY PROGRESS NOTE - NSBHMSEPERCEPT_PSY_A_CORE
No abnormalities
Other
No abnormalities
Other

## 2020-12-24 NOTE — BH INPATIENT PSYCHIATRY PROGRESS NOTE - NSBHMETABOLIC_PSY_ALL_CORE_FT
BMI: BMI (kg/m2): 27.8 (12-15-20 @ 15:55)  HbA1c:   Glucose: POCT Blood Glucose.: 105 mg/dL (12-18-20 @ 16:16)    BP: --  Lipid Panel: Date/Time: 12-16-20 @ 12:20  Cholesterol, Serum: 235  Direct LDL: --  HDL Cholesterol, Serum: 48  Total Cholesterol/HDL Ration Measurement: --  Triglycerides, Serum: 56  
BMI: BMI (kg/m2): 27.8 (12-15-20 @ 15:55)  HbA1c: A1C with Estimated Average Glucose Result: 5.3 % (12-21-20 @ 10:00)    Glucose: POCT Blood Glucose.: 98 mg/dL (12-22-20 @ 07:43)    BP: 125/67 (12-22-20 @ 07:56) (115/67 - 125/67)  Lipid Panel: Date/Time: 12-21-20 @ 10:00  Cholesterol, Serum: 193  Direct LDL: --  HDL Cholesterol, Serum: 46  Total Cholesterol/HDL Ration Measurement: --  Triglycerides, Serum: 94  
BMI: BMI (kg/m2): 27.8 (12-15-20 @ 15:55)  HbA1c: A1C with Estimated Average Glucose Result: 5.3 % (12-21-20 @ 10:00)    Glucose: POCT Blood Glucose.: 93 mg/dL (12-21-20 @ 08:14)    BP: 115/67 (12-21-20 @ 07:44) (115/67 - 117/71)  Lipid Panel: Date/Time: 12-21-20 @ 10:00  Cholesterol, Serum: 193  Direct LDL: --  HDL Cholesterol, Serum: 46  Total Cholesterol/HDL Ration Measurement: --  Triglycerides, Serum: 94  
BMI: BMI (kg/m2): 27.8 (12-15-20 @ 15:55)  HbA1c:   Glucose: POCT Blood Glucose.: 91 mg/dL (12-20-20 @ 07:47)    BP: 117/71 (12-20-20 @ 08:28) (117/71 - 117/71)  Lipid Panel: Date/Time: 12-16-20 @ 12:20  Cholesterol, Serum: 235  Direct LDL: --  HDL Cholesterol, Serum: 48  Total Cholesterol/HDL Ration Measurement: --  Triglycerides, Serum: 56  
BMI: BMI (kg/m2): 27.8 (12-15-20 @ 15:55)  HbA1c:   Glucose: POCT Blood Glucose.: 94 mg/dL (12-17-20 @ 07:54)    BP: 133/59 (12-15-20 @ 14:15) (105/65 - 156/92)  Lipid Panel: Date/Time: 12-16-20 @ 12:20  Cholesterol, Serum: 235  Direct LDL: --  HDL Cholesterol, Serum: 48  Total Cholesterol/HDL Ration Measurement: --  Triglycerides, Serum: 56  
BMI: BMI (kg/m2): 27.8 (12-15-20 @ 15:55)  HbA1c: A1C with Estimated Average Glucose Result: 5.3 % (12-21-20 @ 10:00)    Glucose: POCT Blood Glucose.: 98 mg/dL (12-22-20 @ 07:43)    BP: 114/65 (12-23-20 @ 08:19) (114/65 - 125/67)  Lipid Panel: Date/Time: 12-21-20 @ 10:00  Cholesterol, Serum: 193  Direct LDL: --  HDL Cholesterol, Serum: 46  Total Cholesterol/HDL Ration Measurement: --  Triglycerides, Serum: 94  
BMI: BMI (kg/m2): 27.8 (12-15-20 @ 15:55)  HbA1c: A1C with Estimated Average Glucose Result: 5.3 % (12-21-20 @ 10:00)    Glucose: POCT Blood Glucose.: 98 mg/dL (12-22-20 @ 07:43)    BP: 122/60 (12-24-20 @ 08:28) (114/65 - 125/67)  Lipid Panel: Date/Time: 12-21-20 @ 10:00  Cholesterol, Serum: 193  Direct LDL: --  HDL Cholesterol, Serum: 46  Total Cholesterol/HDL Ration Measurement: --  Triglycerides, Serum: 94  
BMI: BMI (kg/m2): 27.8 (12-15-20 @ 15:55)  HbA1c:   Glucose: POCT Blood Glucose.: 109 mg/dL (12-19-20 @ 08:13)    BP: --  Lipid Panel: Date/Time: 12-16-20 @ 12:20  Cholesterol, Serum: 235  Direct LDL: --  HDL Cholesterol, Serum: 48  Total Cholesterol/HDL Ration Measurement: --  Triglycerides, Serum: 56

## 2020-12-24 NOTE — BH INPATIENT PSYCHIATRY PROGRESS NOTE - NSBHINPTBILLING_PSY_ALL_CORE
15310 - Inpatient Moderate Complexity
60615 - Inpatient Moderate Complexity
78565 - Inpatient Moderate Complexity
87098 - Inpatient Moderate Complexity
29323 - Hospital Discharge Day Management; 30 min or less
39695 - Inpatient Moderate Complexity
98396 - Inpatient Moderate Complexity
50705 - Inpatient Moderate Complexity

## 2020-12-24 NOTE — BH INPATIENT PSYCHIATRY PROGRESS NOTE - NSICDXBHPRIMARYDX_PSY_ALL_CORE
Schizophrenia   F20.9  

## 2020-12-24 NOTE — BH DISCHARGE NOTE NURSING/SOCIAL WORK/PSYCH REHAB - PATIENT PORTAL LINK FT
You can access the FollowMyHealth Patient Portal offered by [facility] by registering at the following website: [email or web address]. By joining [facility]’s FollowMyHealth portal, you will also be able to view your health information using other applications (apps) compatible with our system.

## 2020-12-24 NOTE — BH INPATIENT PSYCHIATRY PROGRESS NOTE - NSTXCOPEGOAL_PSY_ALL_CORE
Other...
Identify and utilize 2 coping skills that meet their needs

## 2020-12-24 NOTE — BH INPATIENT PSYCHIATRY DISCHARGE NOTE - NSBHMETABOLIC_PSY_ALL_CORE_FT
BMI: BMI (kg/m2): 27.8 (12-15-20 @ 15:55)  HbA1c: A1C with Estimated Average Glucose Result: 5.3 % (12-21-20 @ 10:00)    Glucose: POCT Blood Glucose.: 98 mg/dL (12-22-20 @ 07:43)    BP: 122/60 (12-24-20 @ 08:28) (114/65 - 125/67)  Lipid Panel: Date/Time: 12-21-20 @ 10:00  Cholesterol, Serum: 193  Direct LDL: --  HDL Cholesterol, Serum: 46  Total Cholesterol/HDL Ration Measurement: --  Triglycerides, Serum: 94

## 2020-12-24 NOTE — BH INPATIENT PSYCHIATRY PROGRESS NOTE - NSTXPROBMEDIC_PSY_ALL_CORE
MEDICATION/TREATMENT NON-COMPLIANCE

## 2020-12-24 NOTE — BH INPATIENT PSYCHIATRY PROGRESS NOTE - NSBHASSESSSUMMFT_PSY_ALL_CORE
The patient has demonstrated significant improvement in psychotic symptoms. Elevated mood assessed but not reason to continue hospitalization as patient is overall stable and has received a MANCILLA. Her outpatient provider will be notified to monitor her for hypomanic/manic symptoms. She is no longer an acute risk of harm to herself and others. The patient appears motivated. Medication compliant. No SE noted.    Plan:  Prolixin 12.5mg next 12/31.  Supplement with Prolixin 5mg QHS for 2 weeks    Dispo: D/C to [facility].

## 2020-12-24 NOTE — BH TREATMENT PLAN - NSTXDCFAMGOAL_PSY_ALL_CORE
Will agree to involve supports/family in treatment and discharge planning
Will agree to involve supports/family in treatment and discharge planning

## 2020-12-24 NOTE — BH INPATIENT PSYCHIATRY PROGRESS NOTE - NSBHCONSBHPROVDETAILS_PSY_A_CORE  FT
Will attempt to contact provider today.
Florecita Aguilar, therapist, was contacted. She reported the psychiatrist was unavailable to speak and to send the discharge summary. Florecita is aware of the patient's discharge date and appointment.
Florecita Aguilar, therapist, was contacted. She reported the psychiatrist was unavailable to speak and to send the discharge summary. Florecita is aware of the patient's discharge date and appointment.
Will attempt to contact provider today.

## 2020-12-24 NOTE — BH INPATIENT PSYCHIATRY DISCHARGE NOTE - DESCRIPTION
undomiciled, adopted, unemployed, has 2 children (6 and 2) who live with father. He has temporary custody. Active in her [employer].

## 2020-12-24 NOTE — BH DISCHARGE NOTE NURSING/SOCIAL WORK/PSYCH REHAB - NSDCPRGOAL_PSY_ALL_CORE
Pt made progress towards her goals toward his discharge. Pt has demonstrate daily compliant with medication/treatment during this hospitalization. Pt has verbalized benefits of medication compliance such as mood stabilized, less anxious, better concentration. Pt received MANCILLA prior to her discharge. Pt has identified coping skills such as writing, reading, and playing brain game to manage anxiety. Pt currently denies SI, HI, AH, and VH.    During this hospitalization, pt showed approximately 90% of group attendance.  During the group session, pt was able to tolerate group structure, actively participated with relevant feedback. Pt was visible on the unit and showed good interactions with others. Pt maintained fair ADLS.      Pt has participated in patient safety plan and received a copy of it. Pt was provided with [employer] survey.

## 2020-12-24 NOTE — BH INPATIENT PSYCHIATRY PROGRESS NOTE - NSBHMSESPEECH_PSY_A_CORE
Normal volume, rate, productivity, spontaneity and articulation

## 2020-12-24 NOTE — BH INPATIENT PSYCHIATRY PROGRESS NOTE - NSTXPROBCOPE_PSY_ALL_CORE
COPING, INEFFECTIVE

## 2020-12-24 NOTE — BH INPATIENT PSYCHIATRY PROGRESS NOTE - NSBHCONTPROVIDER_PSY_ALL_CORE
Yes...
No, attempted...
Yes...
No, attempted...

## 2020-12-24 NOTE — BH TREATMENT PLAN - NSTXPROBDCFAM_PSY_ALL_CORE
DISCHARGE ISSUE - POOR ENGAGEMENT WITH SUPPORTS/FAMILY
DISCHARGE ISSUE - POOR ENGAGEMENT WITH SUPPORTS/FAMILY

## 2020-12-24 NOTE — BH INPATIENT PSYCHIATRY PROGRESS NOTE - NSTREATMENTCERTY_PSY_ALL_CORE

## 2020-12-24 NOTE — BH INPATIENT PSYCHIATRY PROGRESS NOTE - NSCGIIMPROVESX_PSY_ALL_CORE
2 = Much improved - notably better with signficant reduction of symptoms; increase in the level of functioning but some symptoms remain
3 = Minimally improved - slightly better with little or no clinically meaningful reduction of symptoms.  Represents very little change in basic clinical status, level of care, or functional capacity.
2 = Much improved - notably better with signficant reduction of symptoms; increase in the level of functioning but some symptoms remain
2 = Much improved - notably better with signficant reduction of symptoms; increase in the level of functioning but some symptoms remain

## 2020-12-24 NOTE — BH INPATIENT PSYCHIATRY DISCHARGE NOTE - NSDCCPCAREPLAN_GEN_ALL_CORE_FT
PRINCIPAL DISCHARGE DIAGNOSIS  Diagnosis: Schizophrenia, unspecified type  Assessment and Plan of Treatment:

## 2020-12-24 NOTE — BH TREATMENT PLAN - NSTXCOPEINTERPR_PSY_ALL_CORE
Pt made progress towards her discharge. Pt has identified coping skills such as writing, reading, and playing brain game to manage anxiety. Pt was provided with a composition notebook by this writer. Pt has utilized it during this hospitalization.
Pt would benefit from working on maintaining good behavioral control. Writer will encourage pt to attend symptom management group and engage in individual therapy session to work on her goal.

## 2020-12-24 NOTE — BH INPATIENT PSYCHIATRY DISCHARGE NOTE - VIOLENCE RISK FACTORS:
Substance abuse/Impulsivity/Noncompliance with treatment/Community stressors that increase the risk of destabilization

## 2020-12-24 NOTE — BH INPATIENT PSYCHIATRY DISCHARGE NOTE - NSDCMRMEDTOKEN_GEN_ALL_CORE_FT
benztropine 1 mg oral tablet: 1 tab(s) orally 2 times a day  fluPHENAZine 5 mg oral tablet: 1 tab(s) orally once a day (at bedtime)

## 2020-12-24 NOTE — BH INPATIENT PSYCHIATRY PROGRESS NOTE - NSBHFUPINTERVALHXFT_PSY_A_CORE
Patient is followed up for Schizophrenia, admitted for psychosis and bizarre behaviors.  Chart, medications and labs reviewed with team. No significant events overnight or over the weekend. The patient reports she is eating and sleeping normally. The patient was observed on the unit engaging with other patients. She reports she is feeling good about her discharge. She also reported that she and 3 other patients had a discussion about God yesterday and she found it to be therapeutic.   Pt. denied AVH and delusions, SI/HI intent or plan. Medication compliant. No SE noted.
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
## 2020-12-24 NOTE — BH INPATIENT PSYCHIATRY PROGRESS NOTE - NSBHPSYCHOLCOGORIENT_PSY_A_CORE
Oriented to time, place, person, situation

## 2020-12-24 NOTE — BH DISCHARGE NOTE NURSING/SOCIAL WORK/PSYCH REHAB - NSCDUDCCRISIS_PSY_A_CORE
[facility]  1 (490) [facility]-WELL ([phone])  Text "WELL" to 11457  Website: www.[employer]/.[employer] [facility] 1 (352) 381-EUZX (9161) Website: www.safehorizon.org/.National Suicide Prevention Lifeline 0 (319) 226-8617/.  [facility]  1 (895) LIFENET (106-3065)/.  [facility]  [address] [place] [phone]   Hours:  Monday through Friday from 9 AM to 3 PM/.  [employer] - [facility] Crisis Line  [phone], Option 1

## 2020-12-24 NOTE — BH TREATMENT PLAN - NSCMSPTSTRENGTHS_PSY_ALL_CORE
Expressive of emotions/Sabrina/spirituality/Humor/Intelligence/Interpersonal skills/Physically healthy/Self-reliant/Supportive family
Expressive of emotions/Sabrina/spirituality/Humor/Intelligence/Interpersonal skills/Physically healthy/Self-reliant/Supportive family

## 2020-12-24 NOTE — BH SAFETY PLAN - DISTRACTION NAME 1
FOR SORE THROAT:  · Start the antibiotic as soon as possible.( $8.62)  · Use the Magic Mouthwash as needed for pain, up to 4 times per day. Mix the prescription viscous lidocaine with equal parts of over-the-counter Maalox ($14)  · Stay well hydrated.  · If needed for pain, consider taking Tylenol 1000 mg at the same time as 400 mg of ibuprofen.   You can take this every 4-6 hours. Do not take more than 4000 mg of Tylenol in 24 hours.   Do not take more than 3200 mg of ibuprofen in 24 hours.  · Alert a healthcare provider if you have difficulty swallowing, uncontrolled fevers, vomiting, or any other new, concerning symptoms.      Patient Education   Home  Back    Strep Throat Culture  At a Glance  Also known as  Throat culture, Rapid strep test  Formal Name  Group A streptococcus, Group A beta hemolytic streptococcus  Related tests  Influenza, Blood culture, ASO  Why get tested?  To determine if a sore throat (pharyngitis) is caused by a group A streptococcal bacterial infection (“strep throat”)  When to get tested?  If you have a sore throat that your doctor thinks may be due to a bacterial respiratory infection  Sample required  A swab rubbed against your tonsils and the back of your throat  The Test Sample  What is being tested?  This test identifies the presence of the bacteria Streptococcus pyogenes, also known as group A beta-hemolytic streptococcus and group A streptococcus. Group A streptococci can infect the back of the throat (the pharynx) and cause “strep throat,” the most common bacterial cause of pharyngitis (sore throat).  How is the sample collected for testing?  A doctor, nurse, or other healthcare professional uses a tongue depressor to hold down your tongue and then inserts a special swab into your mouth and rubs it against the back of your throat and tonsils. The swab may be used to do a rapid strep test in a doctor’s office or clinic or it may be sent to a laboratory, where a rapid strep test  and/or a throat culture is performed.  The Test  How is it used?  This test is used to determine whether a patient has pharyngitis that is due to a group A streptococcal infection.  Most sore throats are caused by a virus and may resolve without therapy within a few days. The bacteria group A streptococci cause pharyngitis in 5-15% of adults and 15-30% of children. It is important that these strep infections be promptly identified and treated with antibiotics. Strep throat is contagious and can spread to close contacts. If the infection is not treated, secondary complications may develop, such as rheumatic fever (which can damage the heart) and glomerulonephritis (which affects the kidneys). Because streptococcal infections are routinely diagnosed and treated, these complications have become much more rare in the [place], but they do still occur.  A rapid strep test and/or a throat culture can be used to diagnose group A streptococci as the cause of your sore throat and allow your doctor to prescribe the proper antibiotics for treatment.  If results of the rapid test, which takes 10 - 20 minutes, are positive, further testing is not needed and your doctor will start you on antibiotic therapy. If the rapid strep test is negative, a culture to grow the bacteria should be done to confirm the results. A throat culture is more accurate than the rapid strep test, but it may take several days to get results.  When is it ordered?  Your doctor will typically order this test if you have a sore throat and a fever that might be due to an upper respiratory infection. Symptoms vary and may include:  · sore throat  · fever  · headache  · tonsils that appear red with white or yellow spots at the back of the throat  · a swollen, tender neck  · weakness  · loss of appetite  What does the test result mean?  A positive throat culture or rapid strep test indicates the presence of group A streptococci, the bacteria that cause strep  throat. A negative rapid test indicates that you probably do not have strep throat, but the possibility cannot be ruled out until the laboratory performs a culture. Note that recent antibiotic therapy or gargling with some mouthwashes may affect test results.  Is there anything else I should know?  Strep throat spreads from person-to-person through contact with respiratory secretions that contain the streptococcal bacteria. During flu season, the early symptoms of influenza (fever, chills, headache, sore throat, muscle pain) may mimic strep throat. Rarely, these same symptoms may be due to a more serious acute illness, septicemia. To differentiate between strep and the flu, a rapid strep test and a rapid influenza test may both be done. If both tests are negative and the clinical signs warrant it, a complete blood count (CBC) may be ordered to evaluate the patient’s white blood cells and blood cultures may be drawn to rule out sepsis. The treatment for each of these infections differs greatly and making a prompt diagnosis is imperative to starting the correct therapy.  Most patients with streptococcal pharyngitis will eventually recover without antibiotic treatment but will be contagious for a longer period of time and are at a greater risk of developing secondary complications.  Strep throat is most common in 5- to 10- year olds. Up to 20% of school children may be \"carriers,\" persons who have the bacteria but who have no symptoms. Carriers can still spread the infection to others.  Common Questions  1. How long does treatment for strep throat usually last?  Ten to 14 days, depending on the antibiotic prescribed.  2. How long should I stay away from other people if I have a positive test result?  You should complete at least 24 hours of antibiotics before close respiratory contact with others.  3. When can my child go back to school?  Usually after one full day of therapy and absence of significant fever  4. If one  child in my family has strep throat, is everyone going to get sick?  Other family members, including adults, can contract an infection from the bacteria. The doctor will usually test all family members who have sore throats and may in some instances want to test the whole family for strep throat. Although antibodies may protect those who have had previous strep infections, there are so many serotypes of this organism that being immune to all of them is unlikely. Therefore, people can get strep throat again and again.  5. What is an ASO test and how is it used to detect a strep infection?  Antistreptolysin O (ASO) is a blood test used to help diagnose a current or past infection with group A strep (Streptococcus pyogenes). It detects antibodies to streptolysin O, one of the many strep antigens. This test is rarely ordered now compared to thirty years ago. For an acute strep throat infection, this test is not performed; the throat culture is used. However, if a doctor is trying to find out if someone had a recent strep infection that may not have been diagnosed, this test could be helpful. In addition, it may be used to help diagnose rheumatic fever, which occurs weeks after a strep throat infection when the throat culture would no longer be positive.  6. Do other group A streptoccous infections occur?  Group A streptococcus can also cause infections that occur separately from strep throat, such as impetigo and (rarely) more invasive conditions such as toxic shock syndrome or necrotizing faciitis (the so-called “flesh-eating bacteria”).      [id number] [employer]      My

## 2020-12-24 NOTE — BH INPATIENT PSYCHIATRY DISCHARGE NOTE - NSBHDCRISKMITIGATE_PSY_ALL_CORE
Safety planning/Referral to case management/Family/Other social support involvement/Long acting injectable medication/Medications targeting suicidality/non-suicidal self injurious behavior

## 2020-12-24 NOTE — BH TREATMENT PLAN - NSTXPATIENTPARTICIPATE_PSY_ALL_CORE
Patient participated in identification of needs/problems/goals for treatment/Patient participated in defining interventions/Patient participated in development of after care plan
Patient participated in identification of needs/problems/goals for treatment/Patient participated in defining interventions/Patient participated in development of after care plan

## 2020-12-24 NOTE — BH INPATIENT PSYCHIATRY PROGRESS NOTE - NSBHCHARTREVIEWVS_PSY_A_CORE FT
Vital Signs Last 24 Hrs  T(C): 36.7 (23 Dec 2020 08:19), Max: 36.7 (23 Dec 2020 08:19)  T(F): 98 (23 Dec 2020 08:19), Max: 98 (23 Dec 2020 08:19)  HR: 60 (23 Dec 2020 08:19) (60 - 60)  BP: 114/65 (23 Dec 2020 08:19) (114/65 - 114/65)  BP(mean): --  RR: --  SpO2: --
Vital Signs Last 24 Hrs  T(C): 36.9 (18 Dec 2020 14:48), Max: 37.1 (17 Dec 2020 19:49)  T(F): 98.4 (18 Dec 2020 14:48), Max: 98.7 (17 Dec 2020 19:49)  HR: --80  BP: --112/76  BP(mean): --  RR: 16 (18 Dec 2020 11:07) (16 - 18)  SpO2: 100% (18 Dec 2020 11:07) (99% - 100%)
Vital Signs Last 24 Hrs  T(C): 35.6 (22 Dec 2020 14:43), Max: 36.8 (22 Dec 2020 07:56)  T(F): 96.1 (22 Dec 2020 14:43), Max: 98.2 (22 Dec 2020 07:56)  HR: 60 (22 Dec 2020 07:56) (60 - 60)  BP: 125/67 (22 Dec 2020 07:56) (125/67 - 125/67)  BP(mean): --  RR: 18 (21 Dec 2020 21:25) (18 - 18)  SpO2: 100% (21 Dec 2020 21:25) (100% - 100%)
Vital Signs Last 24 Hrs  T(C): 36.3 (21 Dec 2020 14:41), Max: 37.1 (20 Dec 2020 19:00)  T(F): 97.4 (21 Dec 2020 14:41), Max: 98.7 (20 Dec 2020 19:00)  HR: 60 (21 Dec 2020 07:44) (60 - 60)  BP: 115/67 (21 Dec 2020 07:44) (115/67 - 115/67)  BP(mean): --  RR: 18 (21 Dec 2020 07:44) (18 - 18)  SpO2: 100% (20 Dec 2020 20:53) (100% - 100%)
Vital Signs Last 24 Hrs  T(C): 36.3 (24 Dec 2020 08:28), Max: 36.5 (23 Dec 2020 14:40)  T(F): 97.3 (24 Dec 2020 08:28), Max: 97.7 (23 Dec 2020 14:40)  HR: 61 (24 Dec 2020 08:28) (61 - 61)  BP: 122/60 (24 Dec 2020 08:28) (122/60 - 122/60)  BP(mean): --  RR: 18 (23 Dec 2020 21:47) (18 - 18)  SpO2: 100% (23 Dec 2020 21:47) (100% - 100%)
Vital Signs Last 24 Hrs  T(C): 37.1 (20 Dec 2020 06:38), Max: 37.1 (20 Dec 2020 06:38)  T(F): 98.8 (20 Dec 2020 06:38), Max: 98.8 (20 Dec 2020 06:38)  HR: 72 (20 Dec 2020 08:28) (72 - 72)  BP: 117/71 (20 Dec 2020 08:28) (117/71 - 117/71)  BP(mean): --  RR: 18 (19 Dec 2020 22:28) (18 - 18)  SpO2: 99% (19 Dec 2020 22:28) (99% - 99%)
Vital Signs Last 24 Hrs  T(C): 36.4 (19 Dec 2020 08:01), Max: 37 (18 Dec 2020 20:07)  T(F): 97.6 (19 Dec 2020 08:01), Max: 98.6 (18 Dec 2020 20:07)  HR: --  BP: --  BP(mean): --  RR: 17 (18 Dec 2020 22:12) (16 - 17)  SpO2: 100% (18 Dec 2020 22:12) (100% - 100%)
Vital Signs Last 24 Hrs  T(C): 36.4 (16 Dec 2020 14:37), Max: 36.4 (16 Dec 2020 14:37)  T(F): 97.5 (16 Dec 2020 14:37), Max: 97.5 (16 Dec 2020 14:37)  HR: --  BP: --  BP(mean): --  RR: 18 (16 Dec 2020 21:51) (18 - 18)  SpO2: 100% (16 Dec 2020 21:51) (100% - 100%)

## 2020-12-24 NOTE — BH TREATMENT PLAN - NSTXPLANTHERAPYSESSIONSFT_PSY_ALL_CORE
Patient's daughter, Tan and Bianca RN at [facility] was provided verbal instructions regarding EGD (MAC) with individual medication/s reviewed. All questions answered to otto's satisfaction. Patient is scheduled on Monday, October 1, 2018 at 1420 and will arrive 1245.  Patient's RN, Bianca, will make arrangements for a caretaker to accompany patient to and from procedure.     Bianca RN was instructed RE: arrival/procedural time, department location/parking, leaving valuables at home, bring a photo ID and insurance card/s.  Bianca RN was instructed NOT to allow patient to wear any jewelry, lotions & powders.   Bianca verbalized understanding.         
  12-22-20  Type of therapy: Anger management,Dialectical behavior therapy,Creative arts therapy,Educational/vocational,Health and fitness,Leisure development,Medication management,Relapse prevention,Stress management,Dance movement group   Type of session: Individual  Level of patient participation: Attentive,Engaged,Participates  Duration of participation: 15 minutes  Therapy conducted by: Psych rehab  Therapy Summary: During the individual therapy session, pt stated she missed her daughter’s birthday on Saturday because of this hospitalization. Pt has verbalized a need to quit smoking marijuana. Pt stated she has been in and out of hospital many times because of marijuana. Pt stated she is going to focus on her relapse prevention in her recovery.  Writer provided support.       In response to COIVD-19 outbreak, there is a shift change in hospital wide protocols and policies. Psychiatric rehabilitation staff has been providing groups with requirement of social distancing and wearing mask. Pt showed approximately 95% of group attendance over this past week. During the group session, pt was able to tolerate group structure, actively participated with relevant feedback. Pt was visible on the unit and showed good interactions with others.

## 2020-12-24 NOTE — BH TREATMENT PLAN - NSDCCRITERIA_PSY_ALL_CORE
Sx reduction, medication management, safety planning, milieu therapy. 
Sx reduction, medication management, safety planning, milieu therapy.

## 2020-12-24 NOTE — BH TREATMENT PLAN - NSTXDCFAMINTERSW_PSY_ALL_CORE
SW will maintain contact with collateral source as indicated by HIPAA consent forms. Writer will provide education, support and ongoing discussion of dc planning with pt and her family
SW will maintain contact with collateral source as indicated by HIPAA consent forms. Writer will provide education, support and ongoing discussion of dc planning with pt and her family

## 2020-12-24 NOTE — BH INPATIENT PSYCHIATRY DISCHARGE NOTE - OTHER PAST PSYCHIATRIC HISTORY (INCLUDE DETAILS REGARDING ONSET, COURSE OF ILLNESS, INPATIENT/OUTPATIENT TREATMENT)
As per Leon patient has had 4 past hospitalizations in [place]. As per patient she was most recently hospitalized at [facility] in November.   As per leon patient is a current patient At [facility] in [place] - [phone]. Last billed was on 11/04/2020.
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
## 2020-12-24 NOTE — BH INPATIENT PSYCHIATRY PROGRESS NOTE - NSTXMEDICGOAL_PSY_ALL_CORE
Take all medications as prescribed

## 2020-12-24 NOTE — BH INPATIENT PSYCHIATRY PROGRESS NOTE - NSDCCRITERIA_PSY_ALL_CORE
Sx reduction, medication management, safety planning, milieu therapy. 

## 2020-12-24 NOTE — BH INPATIENT PSYCHIATRY PROGRESS NOTE - NSBHMSEBODY_PSY_A_CORE
Well nourished/Average build/Other

## 2020-12-24 NOTE — BH INPATIENT PSYCHIATRY PROGRESS NOTE - NSBHFUPINTERVALCCFT_PSY_A_CORE
"Can I be discharged tomorrow?"
"I feel happy today it's my daughter 6th birthday today"
"I hope I get discharge before [place]". 
no
"I know what to do now"
"I'm doing alright"
"I need to stop smoking weed"
"Can I leave Saturday?"
"Can I leave Saturday?"

## 2020-12-24 NOTE — BH INPATIENT PSYCHIATRY PROGRESS NOTE - NSTXDCFAMGOAL_PSY_ALL_CORE
Will agree to involve supports/family in treatment and discharge planning

## 2020-12-24 NOTE — BH TREATMENT PLAN - NSTXCAREGIVERPARTICIPATE_PSY_P_CORE
Family/Caregiver participated in identification of needs/problems/goals for treatment
Family/Caregiver participated in identification of needs/problems/goals for treatment

## 2020-12-24 NOTE — ADVANCED PRACTICE NURSE CONSULT - ASSESSMENT
Patient was educated on meal planning; to eat consistent ( 3 meals a day) carbohydrates.  Patient was educated on the food group and how to create a balance meal  Patient was educated on being active 5 days a week for 30 minutes.  Patient verbalized that she will fill half of her plate with vegetables, use beans for her protein and rice   Patient reported that she will ride her bike and stretch for 30 minutes for five days a week

## 2020-12-24 NOTE — BH INPATIENT PSYCHIATRY PROGRESS NOTE - NSTXPROBDCFAM_PSY_ALL_CORE
DISCHARGE ISSUE - POOR ENGAGEMENT WITH SUPPORTS/FAMILY

## 2020-12-29 DIAGNOSIS — Z71.89 OTHER SPECIFIED COUNSELING: ICD-10-CM

## 2020-12-30 RX ORDER — FLUPHENAZINE HYDROCHLORIDE 1 MG/1
12.5 TABLET, FILM COATED ORAL
Qty: 0.5 | Refills: 0
Start: 2020-12-30 | End: 2020-12-30

## 2020-12-30 NOTE — BH INPATIENT PSYCHIATRY PROGRESS NOTE - NSBHCONSDANGERSELF_PSY_A_CORE
Patient instructed to increase oral fluid intake today.   Patient to eat solid food within 2 hours after procedure.  Call office with any concerns or questions.  
unable to care for self

## 2021-03-19 NOTE — BH INPATIENT PSYCHIATRY PROGRESS NOTE - NSBHMSEEYE_PSY_A_CORE
Goal Outcome Evaluation:  Plan of Care Reviewed With: patient  Progress: improving  Outcome Summary: Pt feeling better today.  Pt on 5L O2 via nc, MI bed mobility and cga for LB dressing.  Pt walked 128' with cga using RW.  Pt completed ther ex per flow sheet using red theraband for resistance.  
Fair

## 2021-11-28 NOTE — BH INPATIENT PSYCHIATRY PROGRESS NOTE - NSBHMSEGROOM_PSY_A_CORE
RN spoke with [facility]  Nette Marte [phone]. Stated that she will complete a drive by home visit and that she is going to call mom again. States that per her supervisor, [facility] does not plan to take custody d/t patient already having open DCFS cases and an assigned  already.     Following this conversation, RN received call from another DCFS worker, Tequila Narayan, who clarified Nette's statements. Tequila provided me with patient's already assigned , and that her previously assigned  will be made aware of these occurences in the PED. Per Tequila, patient main [facility]  (2 other pending cases) is Coreen Lubin, [phone].   
Fair

## 2021-12-01 PROCEDURE — G9005: CPT

## 2023-04-26 NOTE — ED ADULT TRIAGE NOTE - INTERNATIONAL TRAVEL
Post-Care Instructions: I reviewed with the patient in detail post-care instructions. Patient is to wear sunprotection, and avoid picking at any of the treated lesions. Pt may apply Vaseline to crusted or scabbing areas.
Render Post-Care Instructions In Note?: no
Duration Of Freeze Thaw-Cycle (Seconds): 6
Show Aperture Variable?: Yes
Detail Level: Detailed
Consent: The patient's consent was obtained including but not limited to risks of crusting, scabbing, blistering, scarring, darker or lighter pigmentary change, recurrence, incomplete removal and infection.
Number Of Freeze-Thaw Cycles: 2 freeze-thaw cycles
No

## 2023-08-25 NOTE — BH SAFETY PLAN - DISTRACTION PLACE 2
go to my counselor
For information on Fall & Injury Prevention, visit: [email or web address] OR  [email or web address] OR  https://www.cdc.gov/steadi/patient.html

## 2024-03-11 NOTE — BH TREATMENT PLAN - NSTXMEDICINTERPR_PSY_ALL_CORE
Patient : Katherin Galvez Age: 87 year old Sex: female   MRN: [id number] Encounter Date: 3/11/2024      History     Chief Complaint   Patient presents with    Fall    Hip Pain     This is an 87-year-old female who presents to the emergency department for evaluation of right hip pain.  The patient unfortunately had a fall at her home yesterday around 11:00 p.m..  Patient was able to ambulate back to bed.  The patient does have a 24/7 home caregiver.  The patient is noted to have a fentanyl patch which stays on throughout the day.  The patient was brought to the emergency department today via EMS for evaluation.  Patient has not been having any fevers, chills and denies any headache.        Allergies   Allergen Reactions    Azithromycin SHORTNESS OF BREATH    Sulfa Antibiotics DIARRHEA    Buprenorphine DIZZINESS    Cefdinir Hallucinations    Ciprofloxacin Other (See Comments)     achiness    Cyclobenzaprine Other (See Comments)     Cardiac related    Demerol Other (See Comments)     hyperactive    Disopyramide CARDIAC DISTURBANCES     Unsure clear reaction from patient response.     Epinephrine DIZZINESS    Meloxicam Other (See Comments) and DIZZINESS    Penicillins RASH    Tramadol DIZZINESS    Fosamax Nausea & Vomiting       Current Discharge Medication List        Prior to Admission Medications    Details   buPROPion XL (WELLBUTRIN XL) 150 MG 24 hr tablet [None received]      folic acid (FOLATE) 1 MG tablet Take 2 tablets by mouth daily.  Qty: 180 tablet, Refills: 3      fentaNYL (DURAGESIC) 50 MCG/HR patch Place 1 patch onto the skin every 72 hours.  Qty: 10 patch, Refills: 0      ipratropium (ATROVENT) 0.06 % nasal spray Spray 2 sprays in each nostril 4 times daily.  Qty: 15 mL, Refills: 11      DULoxetine (CYMBALTA) 20 MG capsule TAKE 3 CAPSULES BY MOUTH DAILY  Qty: 90 capsule, Refills: 11      doxycycline hyclate (VIBRAMYCIN) 100 MG capsule Take 1 capsule by mouth in the morning and 1 capsule in the evening.  Qty: 
20 capsule, Refills: 0      flecainide (TAMBOCOR) 50 MG tablet TAKE 1 TABLET BY MOUTH DAILY  Qty: 90 tablet, Refills: 3      lidocaine (LIDODERM) 5 % patch Place 1 patch onto the skin every 24 hours. Remove patch 12 hours after applying  Qty: 12 patch, Refills: 0      tiotropium (Spiriva HandiHaler) 18 MCG capsule for inhaler Place 1 capsule into inhaler and inhale daily.  Qty: 30 capsule, Refills: 11      albuterol 108 (90 Base) MCG/ACT inhaler Inhale 2 puffs into the lungs every 4 hours as needed for Shortness of Breath or Wheezing.  Qty: 1 each, Refills: 11      metoPROLOL succinate (TOPROL-XL) 25 MG 24 hr tablet Take 0.5 tablets by mouth in the morning and 0.5 tablets in the evening.  Qty: 90 tablet, Refills: 11      Vortioxetine HBr (Trintellix) 20 MG Tab Take 1 tablet by mouth daily.      nortriptyline (PAMELOR) 25 MG capsule       pantoprazole (PROTONIX) 40 MG tablet TAKE 1 TABLET BY MOUTH DAILY  Qty: 90 tablet, Refills: 3      DULoxetine (Cymbalta) 30 MG capsule Take 1 capsule by mouth daily.  Qty: 30 capsule, Refills: 3      mirtazapine (REMERON) 30 MG tablet Take 30 mg by mouth.      levothyroxine 25 MCG tablet TAKE 1 TABLET BY MOUTH DAILY  Qty: 90 tablet, Refills: 3      fluticasone (FLONASE) 50 MCG/ACT nasal spray every 24 hours.      saline 0.9 % Aero Soln Spray 2-4 sprays in each nostril.      naLOXone (Narcan) 4 MG/0.1ML nasal spray Spray the content of 1 device into 1 nostril. Call 911. May repeat with 2nd device in alternate nostril if no response in 2-3 minutes.  Qty: 2 each, Refills: 0      betamethasone valerate (VALISONE) 0.1 % cream Apply topically 2 times daily.  Qty: 1 each, Refills: 0      Collagen Hydrolysate, Bovine, Powder       Turmeric (QC TUMERIC COMPLEX PO)       fluticasone (FLONASE) 50 MCG/ACT nasal spray Spray 1 spray in each nostril 2 times daily.  Qty: 16 g, Refills: 12      Nutritional Supplements (ENSURE PO) Take 1 Can by mouth daily.      saccharomyces boulardii (FLORASTOR) 
Pt made progress towards her discharge. Pt has demonstrated daily compliant with medication. Pt has verbally agreed to comply with medication post-discharge. Pt has identified benefits of medication compliance such as mood stabilized, less anxious, better concentration. Pt stated she received MANCILLA prior to her discharge.
250 MG capsule Take 250 mg by mouth daily. Take 1 daily      albuterol-ipratropium 2.5 mg/0.5 mg (DUONEB) 0.5-2.5 (3) MG/3ML nebulizer solution Take 3 mLs by nebulization every 4 hours as needed for Wheezing.  Qty: 360 mL, Refills: 5      budesonide-formoterol (SYMBICORT) 160-4.5 MCG/ACT inhaler Inhale 2 puffs into the lungs 2 times daily.  Qty: 10.2 g, Refills: 5      LORazepam (ATIVAN) 1 MG tablet Take 1 mg by mouth 2 times daily. 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)     Osteoarthrosis, unspecified whether generalized or localized, unspecified site     Other malignant neoplasm of skin of trunk, except scrotum     Personal history of alcoholism (CMD)     SCC (squamous cell carcinoma) 08/17/2021    lateral left upper arm    Squamous cell carcinoma     Thyroid condition     Unspecified asthma(493.90)        Past Surgical History:   Procedure Laterality Date    CHOLECYSTECTOMY      TUBAL LIGATION         Family History   Problem Relation Age of Onset    Allergic Rhinitis Brother     Osteoarthritis Maternal Aunt     Cancer Maternal Aunt         breast    Hypertension Sister     Lung Disease Father         emphysema    Dermatology Mother         cancer       Social History     Tobacco Use    Smoking status: Some Days     Current packs/day: 0.25     Types: Cigarettes    Smokeless tobacco: Never    Tobacco comments:     1-3 cigarettes/day    Vaping Use    Vaping Use: never used   Substance Use Topics    Alcohol use: Not Currently    Drug use: No       E-cigarette/Vaping    E-Cigarette/Vaping Use Never Used     Passive Exposure No     Counseling Given No      E-Cigarette/Vaping Substances & 
Devices       Review of Systems   Constitutional:  Negative for activity change, appetite change, chills, fatigue and fever.   HENT:  Negative for congestion, rhinorrhea, sinus pressure and sore throat.    Eyes:  Negative for photophobia and visual disturbance.   Respiratory:  Negative for chest tightness and shortness of breath.    Cardiovascular:  Negative for chest pain and palpitations.   Gastrointestinal:  Negative for abdominal pain, diarrhea, nausea and vomiting.   Genitourinary:  Negative for decreased urine volume, difficulty urinating, dysuria, flank pain and urgency.   Musculoskeletal:  Negative for arthralgias, back pain, myalgias, neck pain and neck stiffness.   Skin:  Negative for color change, pallor, rash and wound.   Neurological:  Negative for dizziness, syncope, weakness, light-headedness, numbness and headaches.   Hematological:  Negative for adenopathy. Does not bruise/bleed easily.   Psychiatric/Behavioral:  Negative for confusion. The patient is not nervous/anxious.        Physical Exam     ED Triage Vitals [03/11/24 0750]   ED Triage Vitals Group      Temp 98.3 °F (36.8 °C)      Heart Rate 79      Resp 14      /63      SpO2 93 %      EtCO2 mmHg       Height 5' 2\" (1.575 m)      Weight (!) 89 lb 4.6 oz (40.5 kg)      Weight Scale Used Scale in bed      BMI (Calculated) 16.33      IBW/kg (Calculated) 50.1       Physical Exam  Vitals and nursing note reviewed.   Constitutional:       General: She is not in acute distress.     Appearance: Normal appearance. She is well-developed. She is not ill-appearing, toxic-appearing or diaphoretic.   HENT:      Head: Normocephalic and atraumatic.      Right Ear: No drainage, swelling or tenderness. No middle ear effusion. Tympanic membrane is not erythematous or bulging.      Left Ear: No drainage, swelling or tenderness.  No middle ear effusion. Tympanic membrane is not erythematous or bulging.      Nose: Nose normal. No septal deviation or 
rhinorrhea.      Right Sinus: No maxillary sinus tenderness or frontal sinus tenderness.      Left Sinus: No maxillary sinus tenderness or frontal sinus tenderness.      Mouth/Throat:      Mouth: No oral lesions.      Dentition: No dental abscesses.      Pharynx: Uvula midline. No oropharyngeal exudate or posterior oropharyngeal erythema.   Eyes:      General:         Right eye: No discharge.         Left eye: No discharge.      Conjunctiva/sclera: Conjunctivae normal.      Pupils: Pupils are equal, round, and reactive to light.   Neck:      Thyroid: No thyroid mass or thyromegaly.      Vascular: No JVD.      Trachea: Trachea normal. No tracheal deviation.   Cardiovascular:      Rate and Rhythm: Normal rate and regular rhythm.      Pulses: Normal pulses.      Heart sounds: Normal heart sounds.   Pulmonary:      Effort: Pulmonary effort is normal. No accessory muscle usage or respiratory distress.      Breath sounds: Normal breath sounds. No stridor. No decreased breath sounds, wheezing, rhonchi or rales.   Chest:      Chest wall: No tenderness.   Abdominal:      General: Bowel sounds are normal. There is no distension.      Palpations: Abdomen is soft.      Tenderness: There is no abdominal tenderness. There is no guarding or rebound.   Genitourinary:     Comments: Exam deferred  Musculoskeletal:         General: Tenderness and signs of injury present.      Cervical back: Normal range of motion and neck supple.        Legs:    Skin:     General: Skin is warm and dry.      Capillary Refill: Capillary refill takes less than 2 seconds.      Coloration: Skin is not pale.      Findings: No abrasion, bruising, ecchymosis, erythema or rash.   Neurological:      General: No focal deficit present.      Mental Status: She is alert and oriented to person, place, and time. She is not disoriented.      Cranial Nerves: No cranial nerve deficit.      Sensory: No sensory deficit.      Coordination: Coordination normal. 
  Psychiatric:         Speech: Speech normal.         ED Course     Procedures    Lab Results     Results for orders placed or performed during the hospital encounter of 03/11/24   Comprehensive Metabolic Panel   Result Value Ref Range    Fasting Status      Sodium 142 135 - 145 mmol/L    Potassium 4.4 3.4 - 5.1 mmol/L    Chloride 104 97 - 110 mmol/L    Carbon Dioxide 33 (H) 21 - 32 mmol/L    Anion Gap 9 7 - 19 mmol/L    Glucose 111 (H) 70 - 99 mg/dL    BUN 19 6 - 20 mg/dL    Creatinine 0.51 0.51 - 0.95 mg/dL    Glomerular Filtration Rate >90 >=60    BUN/Cr 37 (H) 7 - 25    Calcium 9.2 8.4 - 10.2 mg/dL    Bilirubin, Total 0.8 0.2 - 1.0 mg/dL    GOT/AST 22 <=37 Units/L    GPT/ALT 29 <64 Units/L    Alkaline Phosphatase 68 45 - 117 Units/L    Albumin 3.6 3.6 - 5.1 g/dL    Protein, Total 6.7 6.4 - 8.2 g/dL    Globulin 3.1 2.0 - 4.0 g/dL    A/G Ratio 1.2 1.0 - 2.4   TROPONIN I, HIGH SENSITIVITY   Result Value Ref Range    Troponin I, High Sensitivity 5 <52 ng/L   CBC with Automated Differential (performable only)   Result Value Ref Range    WBC 9.7 4.2 - 11.0 K/mcL    RBC 2.96 (L) 4.00 - 5.20 mil/mcL    HGB 10.3 (L) 12.0 - 15.5 g/dL    HCT 30.9 (L) 36.0 - 46.5 %    .4 (H) 78.0 - 100.0 fl    MCH 34.8 (H) 26.0 - 34.0 pg    MCHC 33.3 32.0 - 36.5 g/dL    RDW-CV 14.2 11.0 - 15.0 %    RDW-SD 53.9 (H) 39.0 - 50.0 fL     140 - 450 K/mcL    NRBC 0 <=0 /100 WBC    Neutrophil, Percent 71 %    Lymphocytes, Percent 17 %    Mono, Percent 10 %    Eosinophils, Percent 1 %    Basophils, Percent 0 %    Immature Granulocytes 1 %    Absolute Neutrophils 6.9 1.8 - 7.7 K/mcL    Absolute Lymphocytes 1.7 1.0 - 4.0 K/mcL    Absolute Monocytes 0.9 0.3 - 0.9 K/mcL    Absolute Eosinophils  0.1 0.0 - 0.5 K/mcL    Absolute Basophils 0.0 0.0 - 0.3 K/mcL    Absolute Immature Granulocytes 0.1 0.0 - 0.2 K/mcL       EKG Results     EKG Interpretation  Rate: 80  Rhythm: normal sinus rhythm   Abnormality: no    EKG tracing interpreted by ED 
physician    Radiology Results     Imaging Results              CT HIP WO CONTRAST RIGHT (Final result)  Result time 03/11/24 12:07:26      Final result                   Impression:    IMPRESSION:    Comminuted, nondisplaced fracture of the greater trochanter. If there is  concern for intertrochanteric extension that is not readily apparent on the  CT study, further evaluation with an MRI would be necessary.    Electronically Signed by: Pradip Baumann MD  Signed on: 3/11/2024 12:07 PM  Created on Workstation ID: [id number]  Signed on Workstation ID: [id number]               Narrative:    EXAMINATION: CT HIP WO CONTRAST RIGHT    HISTORY: fall    COMPARISON: X-ray from the same date    TECHNIQUE: Axial images are performed without contrast.  Reformatted images  were analyzed. Dose reduction techniques (to include either automated  exposure control, adjustment of the mA or kV according to the patient's  size, and/or use of iterative reconstruction technique) were utilized.     FINDINGS:    There is a comminuted, nondisplaced fracture of the greater trochanter.    There are multiple presumed nutrient vessels projecting over the lateral  aspect of the femoral neck.    There is moderate right hip joint space narrowing.    There is no significant fluid collection.                                       CT HEAD WO CONTRAST (Final result)  Result time 03/11/24 09:47:06      Final result                   Impression:    IMPRESSION:    No acute intracranial finding    Electronically Signed by: Pradip Baumann MD  Signed on: 3/11/2024 9:47 AM  Created on Workstation ID: [id number]  Signed on Workstation ID: [id number]               Narrative:    EXAMINATION: CT HEAD WO CONTRAST    HISTORY: fall    COMPARISON: August 8, 2019    TECHNIQUE: Axial images are performed without contrast.  Reformatted images  were analyzed. Dose reduction techniques (to include either automated  exposure control, adjustment of the mA or kV according to the 
patient's  size, and/or use of iterative reconstruction technique) were utilized.     FINDINGS:    There is no hemorrhage. There is no abnormal extra-axial fluid collection.  There is no mass effect or midline shift identified. There is  age-appropriate atrophy. There is a mild degree of chronic small vessel  ischemic change.                                       XR HIP 2 VIEWS RIGHT AND PELVIS (Final result)  Result time 03/11/24 08:26:32      Final result                   Impression:    IMPRESSION: No fractures evident.    Electronically Signed by: Tomasz Daily MD  Signed on: 3/11/2024 8:26 AM  Created on Workstation ID: [id number]  Signed on Workstation ID: [id number]               Narrative:    EXAM: XR HIP 2 VIEWS RIGHT AND PELVIS    DATE OF EXAM: 3/11/2024 8:22 AM.    COMPARISON: None.     CLINICAL INFORMATION: fall.    FINDINGS: Images of the right hip demonstrate no fracture. The right hip  joint space is mildly narrowed.    AP view of the pelvis demonstrates no fracture. Mild narrowing of the  superior aspect left hip evident.                                       XR CHEST PA OR AP 1 VIEW (Final result)  Result time 03/11/24 08:25:23      Final result                   Impression:    IMPRESSION:      Stable chest radiograph. No acute process.      Electronically Signed by: Sami Breen DO  Signed on: 3/11/2024 8:25 AM  Created on Workstation ID: [id number]  Signed on Workstation ID: [id number]               Narrative:    XR CHEST AP OR PA    HISTORY: Fall.    COMPARISON: 5/25/2023.    TECHNIQUE: A single, AP, upright view of the chest is submitted for  evaluation.    FINDINGS:    The heart and pulmonary vascular are stable. Atherosclerosis of the  thoracic aorta. Mitral annulus calcifications. Left apical suture  materials, similar to prior. No focal consolidation. No pleural effusion or  pneumothorax. Degenerative changes of the thoracic spine. Left glenohumeral  osteoarthritis.                   
                   ED Medication Orders (From admission, onward)      None              Medical Decision Making    Patient has been seen and examined.  Patient will need imaging of her hip to rule out a fracture.    Patient's lab work has been reviewed and interpreted by myself. Patient's lab work does not show any acute findings.    Patient's imaging has been reviewed by myself. Patient's imaging does not show any acute findings. Please see the radiology final reading/interpretation regarding the patient's imaging.    Physical therapy has attempted to work with the patient however the patient continues to have pain.  Patient will need a CT scan.    CT scan of the patient's hip does show a nondisplaced greater trochanteric hip fracture.  Please see the radiology final report/interpretation.      Case has been discussed with Orthopedics, Dr. Echols.  He is aware and will see this patient in consultation.  He does state that this patient will be treated non operatively.      12:56 p.m.:  Patient's case has been discussed with the hospitalist, Dr. Severe.  She is aware and accepts this patient for admission.    Clinical Impression     ED Diagnosis   1. Closed nondisplaced fracture of greater trochanter of right femur, initial encounter (CMD)        2. Fall in home, initial encounter            Disposition        Admit 3/11/2024 12:56 PM  Telemetry Bed?: No  Admitting Physician: SEVERE, REBECCA S [[id number]]  Is this a telephone or verbal order?: This is a telephone order from the admitting physician           Brendon Fernandez DO  03/11/24 8999    
Pt would benefit from demonstrating medication compliance. Writer will continue to meet with pt individually and encourage pt to work on her goals with multidisciplinary team.
